# Patient Record
Sex: MALE | Race: WHITE | NOT HISPANIC OR LATINO | Employment: FULL TIME | URBAN - METROPOLITAN AREA
[De-identification: names, ages, dates, MRNs, and addresses within clinical notes are randomized per-mention and may not be internally consistent; named-entity substitution may affect disease eponyms.]

---

## 2021-05-26 ENCOUNTER — RECORDS - HEALTHEAST (OUTPATIENT)
Dept: ADMINISTRATIVE | Facility: CLINIC | Age: 31
End: 2021-05-26

## 2021-05-27 ENCOUNTER — RECORDS - HEALTHEAST (OUTPATIENT)
Dept: ADMINISTRATIVE | Facility: CLINIC | Age: 31
End: 2021-05-27

## 2021-05-29 ENCOUNTER — RECORDS - HEALTHEAST (OUTPATIENT)
Dept: ADMINISTRATIVE | Facility: CLINIC | Age: 31
End: 2021-05-29

## 2021-06-01 ENCOUNTER — RECORDS - HEALTHEAST (OUTPATIENT)
Dept: ADMINISTRATIVE | Facility: CLINIC | Age: 31
End: 2021-06-01

## 2021-06-02 ENCOUNTER — RECORDS - HEALTHEAST (OUTPATIENT)
Dept: ADMINISTRATIVE | Facility: CLINIC | Age: 31
End: 2021-06-02

## 2022-12-20 ENCOUNTER — HOSPITAL ENCOUNTER (EMERGENCY)
Facility: CLINIC | Age: 32
Discharge: HOME | End: 2022-12-20
Attending: EMERGENCY MEDICINE | Admitting: EMERGENCY MEDICINE

## 2022-12-20 VITALS
SYSTOLIC BLOOD PRESSURE: 130 MMHG | OXYGEN SATURATION: 97 % | TEMPERATURE: 99.8 F | HEART RATE: 69 BPM | DIASTOLIC BLOOD PRESSURE: 70 MMHG | RESPIRATION RATE: 18 BRPM | BODY MASS INDEX: 26.18 KG/M2 | WEIGHT: 187 LBS | HEIGHT: 71 IN

## 2022-12-20 DIAGNOSIS — N41.0 ACUTE PROSTATITIS: ICD-10-CM

## 2022-12-20 DIAGNOSIS — K59.00 CONSTIPATION, UNSPECIFIED CONSTIPATION TYPE: ICD-10-CM

## 2022-12-20 LAB
ALBUMIN UR-MCNC: 20 MG/DL
APPEARANCE UR: CLEAR
BILIRUB UR QL STRIP: NEGATIVE
C REACTIVE PROTEIN LHE: 2.7 MG/DL (ref 0–?)
COLOR UR AUTO: YELLOW
ERYTHROCYTE [DISTWIDTH] IN BLOOD BY AUTOMATED COUNT: 14.5 % (ref 10–15)
GLUCOSE UR STRIP-MCNC: NEGATIVE MG/DL
HCT VFR BLD AUTO: 44.8 % (ref 40–53)
HGB BLD-MCNC: 15.2 G/DL (ref 13.3–17.7)
HGB UR QL STRIP: NEGATIVE
HYALINE CASTS: 1 /LPF
KETONES UR STRIP-MCNC: NEGATIVE MG/DL
LEUKOCYTE ESTERASE UR QL STRIP: NEGATIVE
MCH RBC QN AUTO: 30.8 PG (ref 26.5–33)
MCHC RBC AUTO-ENTMCNC: 33.9 G/DL (ref 31.5–36.5)
MCV RBC AUTO: 91 FL (ref 78–100)
MUCOUS THREADS #/AREA URNS LPF: PRESENT /LPF
NITRATE UR QL: NEGATIVE
PH UR STRIP: 6 [PH] (ref 5–7)
PLATELET # BLD AUTO: 291 10E3/UL (ref 150–450)
RBC # BLD AUTO: 4.93 10E6/UL (ref 4.4–5.9)
RBC URINE: 2 /HPF
SP GR UR STRIP: 1.03 (ref 1–1.03)
UROBILINOGEN UR STRIP-MCNC: <2 MG/DL
WBC # BLD AUTO: 7 10E3/UL (ref 4–11)
WBC URINE: 2 /HPF

## 2022-12-20 PROCEDURE — 85041 AUTOMATED RBC COUNT: CPT | Performed by: EMERGENCY MEDICINE

## 2022-12-20 PROCEDURE — 250N000013 HC RX MED GY IP 250 OP 250 PS 637: Performed by: EMERGENCY MEDICINE

## 2022-12-20 PROCEDURE — 81001 URINALYSIS AUTO W/SCOPE: CPT | Performed by: EMERGENCY MEDICINE

## 2022-12-20 PROCEDURE — 99284 EMERGENCY DEPT VISIT MOD MDM: CPT

## 2022-12-20 PROCEDURE — 86140 C-REACTIVE PROTEIN: CPT | Performed by: EMERGENCY MEDICINE

## 2022-12-20 PROCEDURE — 85014 HEMATOCRIT: CPT | Performed by: EMERGENCY MEDICINE

## 2022-12-20 PROCEDURE — 36415 COLL VENOUS BLD VENIPUNCTURE: CPT | Performed by: EMERGENCY MEDICINE

## 2022-12-20 RX ORDER — IBUPROFEN 600 MG/1
600 TABLET, FILM COATED ORAL ONCE
Status: COMPLETED | OUTPATIENT
Start: 2022-12-20 | End: 2022-12-20

## 2022-12-20 RX ORDER — AMOXICILLIN 250 MG
1 CAPSULE ORAL DAILY
Qty: 15 TABLET | Refills: 0 | Status: SHIPPED | OUTPATIENT
Start: 2022-12-20 | End: 2024-09-02

## 2022-12-20 RX ORDER — SULFAMETHOXAZOLE/TRIMETHOPRIM 800-160 MG
1 TABLET ORAL 2 TIMES DAILY
Qty: 42 TABLET | Refills: 0 | Status: SHIPPED | OUTPATIENT
Start: 2022-12-20 | End: 2023-01-10

## 2022-12-20 RX ADMIN — IBUPROFEN 600 MG: 600 TABLET ORAL at 11:13

## 2022-12-20 ASSESSMENT — ACTIVITIES OF DAILY LIVING (ADL)
ADLS_ACUITY_SCORE: 35
ADLS_ACUITY_SCORE: 35
ADLS_ACUITY_SCORE: 33

## 2022-12-20 NOTE — ED NOTES
"ED Triage Provider Note  [facility] EMERGENCY ROOM  Encounter Date: Dec 20, 2022    History:  Chief Complaint   Patient presents with     Hemorrhoids     Oli Akins is a 32 year old male who presents to the ED perirectal pain for 1 week. Feels it's swollen. Has been constipated.  \Exam:  BP (!) 146/100   Pulse 98   Temp 99.8  F (37.7  C) (Temporal)   Resp 18   Ht 1.803 m (5' 11\")   Wt 84.8 kg (187 lb)   SpO2 98%   BMI 26.08 kg/m    General: Mild acute distress. Appears stated age.   Cardio: normal rate, extremities well perfused  Resp: Normal work of breathing, grossly normal respiratory rate  Neuro: Alert. Facial movement grossly symmetric. Grossly intact strength.   Medical Decision Making:  Patient arriving to the ED with problem as above. A medical screening exam was performed.\  Jac Maravilla MD  12/20/2022 at 10:49 AM     Jac Maravilla MD  12/20/22 1051    "

## 2022-12-20 NOTE — ED TRIAGE NOTES
"Patient reports pain to perineum, bumps, swollen. Minimal bleeding. Pain constant.       Triage Assessment     Row Name 12/20/22 1046       Triage Assessment (Adult)    Airway WDL WDL       Respiratory WDL    Respiratory WDL WDL       Skin Circulation/Temperature WDL    Skin Circulation/Temperature WDL X  patient states \"I think there is a rash down there\" re: perineum.       Cardiac WDL    Cardiac WDL WDL       Peripheral/Neurovascular WDL    Peripheral Neurovascular WDL WDL       Cognitive/Neuro/Behavioral WDL    Cognitive/Neuro/Behavioral WDL WDL              "

## 2022-12-20 NOTE — ED PROVIDER NOTES
EMERGENCY DEPARTMENT ENCOUNTER      NAME: Oli Akins  AGE: 32 year old male  YOB: 1990  MRN: [id number]  EVALUATION DATE & TIME: 12/20/2022 10:50 AM    PCP: Johan Park    ED PROVIDER: Jac Maravilla MD      Chief Complaint   Patient presents with     Hemorrhoids         FINAL IMPRESSION:  prostatitis  constipation    ED COURSE & MEDICAL DECISION MAKING:    Pertinent Labs & Imaging studies reviewed. (See chart for details)  32 year old male presents to the Emergency Department for evaluation of perirectal pain and discomfort.  Symptoms ongoing for much of the last week.  Used a suppository as he was of the opinion that he had hemorrhoids.  Suppository created irritation with urination.  Now has burning when he urinates.  This been ongoing for the last 24 hours.  No fevers.  Patient has been troubled by constipation recently.  Patient seen initially in triage and then in the ED.  On exam patient in mild distress.  Abdomen normoactive soft and nontender.  Rectal exam without evidence of hemorrhoids.  No evidence of perianal induration or inflammatory process.  Rectal exam with marked discomfort and marked prostate tenderness.  Primary concern is of UTI/prostatitis.  Patient denies any new sexual contacts or pre-existing penile discharge to suggest STD.  UA being obtained.  Baseline blood work being obtained assess for potential greater infectious process.  Patient treated symptomatically with ibuprofen.  Narcotic pain medications trying to be avoided due to constipation.     11:36AM. CBC normal.  12:24PM. CRP and UA normal . Will treat for constipation and protatitis.  At the conclusion of the encounter I discussed the results of all of the tests and the disposition. The questions were answered. The patient or family acknowledged understanding and was agreeable with the care plan.         MEDICATIONS GIVEN IN THE EMERGENCY:  Medications   ibuprofen (ADVIL/MOTRIN) tablet 600 mg (has  "no administration in time range)       NEW PRESCRIPTIONS STARTED AT TODAY'S ER VISIT  Current Discharge Medication List      START taking these medications    Details   senna-docusate (SENOKOT-S/PERICOLACE) 8.6-50 MG tablet Take 1 tablet by mouth daily  Qty: 15 tablet, Refills: 0      sulfamethoxazole-trimethoprim (BACTRIM DS) 800-160 MG tablet Take 1 tablet by mouth 2 times daily for 21 days  Qty: 42 tablet, Refills: 0                =================================================================    HPI          Oli Akins is a 32 year old male with reports of perirectal discomfort and urinary discomfort.  Patient with constipation for much of the last week.  Then developed achiness in the perineal region over the last few days.  Thought he might have hemorrhoids and used a suppository.  Thereafter developed burning and discomfort with urination over the last 24 hours.  Denies any new sexual contacts or exposures.  No pre-existing penile discharge.  No prior similar episodes.    REVIEW OF SYSTEMS   Review of Systems negative fevers, chills.  No nausea or vomiting.  Constipation is noted.  Dysuria is noted.  Main review of systems otherwise negative     PAST MEDICAL HISTORY:  No past medical history on file.    PAST SURGICAL HISTORY:  No past surgical history on file.        CURRENT MEDICATIONS:    naproxen (NAPROSYN) 500 MG tablet        ALLERGIES:  No Known Allergies    FAMILY HISTORY:  Family History   Problem Relation Age of Onset     Coronary Artery Disease Father        SOCIAL HISTORY:   Social History     Socioeconomic History     Marital status: Single   Tobacco Use     Smoking status: Every Day     Types: Cigarettes   Substance and Sexual Activity     Drug use: Yes     Types: Marijuana   Social History Narrative    6/22/2015: Patient is currently employed.       VITALS:  BP (!) 146/100   Pulse 98   Temp 99.8  F (37.7  C) (Temporal)   Resp 18   Ht 1.803 m (5' 11\")   Wt 84.8 kg (187 lb)   " SpO2 98%   BMI 26.08 kg/m      PHYSICAL EXAM    Constitutional: Well developed, Well nourished, mild distress  HENT: Normocephalic, Atraumatic, Bilateral external ears normal, Oropharynx normal, mucous membranes moist, Nose normal. Neck-  Normal range of motion,   Eyes: PERRL, EOMI, Conjunctiva normal, No discharge.   Respiratory: Normal breath sounds, No respiratory distress, No wheezing, Speaks full sentences easily. No cough.    Cardiovascular: Normal heart rate, Regular rhythm,  No murmurs, No rubs, No gallops.   GI: No excessive obesity. Bowel sounds normal, Soft, No tenderness, No masses, No flank tenderness.   : No external hemorrhoids.  No perianal induration.  Mild to moderate tenderness of the perineal region.  No anal fissures.  Marked prostate tenderness.    Musculoskeletal:. No edema.  No cyanosis, No clubbing. Good range of motion in all major joints.   Integument: Warm, Dry, No erythema, No rash. No petechiae.   Neurologic: Alert & oriented x 3, Normal motor function, Normal sensory function, No focal deficits noted. Normal gait.  Psychiatric: Affect normal, Judgment normal, Mood normal. Cooperative.    LAB:  All pertinent labs reviewed and interpreted.       RADIOLOGY:  Reviewed all pertinent imaging. Please see official radiology report.  No orders to display         Jac Maravilla MD  [facility] EMERGENCY ROOM  [address] [address] [phone]     Jac Maravilla MD  12/20/22 1759

## 2023-01-19 ENCOUNTER — DOCUMENTATION ONLY (OUTPATIENT)
Dept: CARE COORDINATION | Facility: CLINIC | Age: 33
End: 2023-01-19

## 2023-02-02 ENCOUNTER — DOCUMENTATION ONLY (OUTPATIENT)
Dept: CARE COORDINATION | Facility: CLINIC | Age: 33
End: 2023-02-02

## 2024-02-08 ENCOUNTER — HOSPITAL ENCOUNTER (EMERGENCY)
Facility: CLINIC | Age: 34
End: 2024-02-08

## 2024-02-08 ENCOUNTER — HOSPITAL ENCOUNTER (EMERGENCY)
Facility: CLINIC | Age: 34
Discharge: HOME | End: 2024-02-08

## 2024-02-08 VITALS
RESPIRATION RATE: 16 BRPM | HEIGHT: 71 IN | WEIGHT: 170 LBS | SYSTOLIC BLOOD PRESSURE: 126 MMHG | BODY MASS INDEX: 23.8 KG/M2 | OXYGEN SATURATION: 97 % | DIASTOLIC BLOOD PRESSURE: 85 MMHG | HEART RATE: 77 BPM | TEMPERATURE: 97.8 F

## 2024-02-08 DIAGNOSIS — S61.213A LACERATION OF LEFT MIDDLE FINGER WITHOUT FOREIGN BODY WITHOUT DAMAGE TO NAIL, INITIAL ENCOUNTER: ICD-10-CM

## 2024-02-08 PROCEDURE — 12001 RPR S/N/AX/GEN/TRNK 2.5CM/<: CPT

## 2024-02-08 PROCEDURE — 99283 EMERGENCY DEPT VISIT LOW MDM: CPT

## 2024-02-08 NOTE — DISCHARGE INSTRUCTIONS
You were seen in the ER for evaluation of laceration. Your laceration was cleaned and repaired with glue. Please update your tetanus with your primary care provider as soon as possible.    Rest, ice, elevate your extremity, Tylenol and/or ibuprofen as needed for pain. Keep your bandage in place and clean and dry for the first 24 hours, then only clean water - no dirty water (swimming pools, hot tubes, saunas, lakes, etc.) until your wound is healed.    Tylenol (Acetaminophen) Discharge Instructions:  You may take 2 tablets of regular strength, over-the-counter, Tylenol (acetaminophen) every 4-6 hours as needed for pain.  Take no more than 4000 mg of Tylenol in a 24-hour period.      Avoid taking more than 1 acetaminophen-containing product at a time and be aware that many over-the-counter medications contain a combination of acetaminophen and other products.  If you are taking Tylenol in addition to a combination product please keep track of your daily acetaminophen dose to make sure you do not exceed the recommended 4000 mg.  Taking too much acetaminophen can cause permanent damage to your liver.    Ibuprofen/Naproxen Discharge Instructions:  You may take ibuprofen for pain control.  The maximum dose of (ibuprofen is 3200 mg ) in a 24-hour period.    Take this medication with food to prevent stomach irritation.  With long-term use this medication can irritate the stomach causing pain and lead to development of a stomach ulcer.  If you notice stomach pain or vomiting of coffee-ground colored vomit or blood, please be seen by a healthcare provider.  Attempt to use this medication for the shortest time possible.      Follow-up with your primary care provider in 3-5 days for reevaluation.     Return to the emergency department for any new or worsening symptoms including worsening pain, redness/warmth/drainage/swelling, streaking redness up your extremity, fever/chills, new weakness/numbness/tingling, decreased range of  motion, cool extremity, or any other concerning symptoms.    Take Care!  - Dipika Flanagan PA-C

## 2024-02-08 NOTE — ED PROVIDER NOTES
EMERGENCY DEPARTMENT ENCOUNTER      NAME: Oli Akins  AGE: 33 year old male  YOB: 1990  MRN: [id number]  EVALUATION DATE & TIME: No admission date for patient encounter.    PCP: No Ref-Primary, Physician    ED PROVIDER: Dipika Flanagan PA-C      Chief Complaint   Patient presents with    Laceration         FINAL IMPRESSION:  1. Laceration of left middle finger without foreign body without damage to nail, initial encounter          ED COURSE & MEDICAL DECISION MAKING:    3:40 PM Met with patient for initial interview. Plan for care discussed.   4:00 PM Reevaluated and updated patient. Laceration repair performed. I discussed the plan for discharge with the patient, and patient is agreeable. We discussed supportive cares at home and reasons for return to the ER including new or worsening symptoms. All questions and concerns addressed. Patient to be discharged by RN.    33 year old male presents to the Emergency Department for evaluation of laceration after accidentally cutting it with a pocket knife prior to arrival. Upon exam, patient is afebrile, hemodynamically stable, and in no acute distress. Superficial horizontal laceration middle phalanx left middle finger. No active bleeding. No evidence of tendon or nail involvement. No obvious foreign body. 5/5 strength. Discussed option of XR with patient to rule out fracture or foreign body, which patient declines.    Per chart review, tetanus not up-to-date, Tdap ordered in ED, but patient declining prior to discharge as does not want to stay to get it and states he will update it with his PCP. Based on the presenting symptoms, the wound was irrigated, explored, and closed with Dermabond as documented below. Patient was educated on signs of infection including redness, drainage, warmth, fever/chills with instructions to return immediately if infection suspected or new weakness/numbness/tingling, decreased ROM, or cold extremity. Patient also  educated to keep the wound clean and dry for the next 24 hours. Patient advised to set up an appointment with PCP in 3-5 days for reevaluation. The patient was advised to return to the ER if new or worsening symptoms develop. The patient was stable and well appearing throughout entire ER visit and upon discharge. The patient verbalizes understanding and agrees with the plan.    Medical Decision Making  Obtained supplemental history:Supplemental history obtained?: No  Reviewed external records: External records reviewed?: No  Care impacted by chronic illness:Smoking / Nicotine Use  Care significantly affected by social determinants of health:N/A  Did you consider but not order tests?: Work up considered but not performed and documented in chart, if applicable  Did you interpret images independently?: Independent interpretation of ECG and images noted in documentation, when applicable.  Consultation discussion with other provider:Did you involve another provider (consultant, , pharmacy, etc.)?: No  Discharge. No recommendations on prescription strength medication(s). See documentation for any additional details.    MEDICATIONS GIVEN IN THE EMERGENCY:  Medications   Tdap (tetanus-diphtheria-acell pertussis) (ADACEL) injection 0.5 mL (0.5 mLs Intramuscular Not Given 2/8/24 1648)       NEW PRESCRIPTIONS STARTED AT TODAY'S ER VISIT  Discharge Medication List as of 2/8/2024  4:46 PM             =================================================================    HPI    Patient information was obtained from: patient    Use of : N/A       Oli Akins is a 33 year old male who presents to this ED for evaluation of laceration. Last tetanus was 1996.  Patient reports accidentally cutting his left middle finger with a pocket knife just prior to arrival.  He reports some bleeding initially, but this has since stopped since putting on pressure.  He denies any weakness/numbness/tingling, decreased range of  "motion, or any significant pain in the finger.  He denies any concern for foreign body or fracture.  He denies pain in the finger.  He denies history of diabetes.  He does admit to tobacco use, 1 pack/day.  He is right-handed.      REVIEW OF SYSTEMS   Review of Systems see HPI.    PAST MEDICAL HISTORY:  No past medical history on file.    PAST SURGICAL HISTORY:  No past surgical history on file.        CURRENT MEDICATIONS:    naproxen (NAPROSYN) 500 MG tablet  senna-docusate (SENOKOT-S/PERICOLACE) 8.6-50 MG tablet        ALLERGIES:  No Known Allergies    FAMILY HISTORY:  Family History   Problem Relation Age of Onset    Coronary Artery Disease Father        SOCIAL HISTORY:   Social History     Socioeconomic History    Marital status: Single   Tobacco Use    Smoking status: Every Day     Types: Cigarettes   Substance and Sexual Activity    Drug use: Yes     Types: Marijuana   Social History Narrative    6/22/2015: Patient is currently employed.       VITALS:  /85   Pulse 77   Temp 97.8  F (36.6  C) (Oral)   Resp 16   Ht 1.803 m (5' 11\")   Wt 77.1 kg (170 lb)   SpO2 97%   BMI 23.71 kg/m      PHYSICAL EXAM    Constitutional:  Alert, in no acute distress. Cooperative.  EYES: Conjunctivae clear.  HENT:  Atraumatic, normocephalic.  Respiratory:  Respirations even, unlabored, in no acute respiratory distress.  Cardiovascular:  Regular rate, good peripheral perfusion.  GI: Soft, flat, non-distended.  Musculoskeletal: Superficial horizontal 2 cm laceration middle phalanx left middle finger. No active bleeding. No evidence of tendon or nail involvement. No surrounding erythema, warmth, purulence, fluctuance, swelling, crepitus, or obvious bony deformity. No obvious foreign body visualized. No nail or tendon involvement. Full ROM without pain. Neurovascularly intact distally. Cap refill <2 seconds. 5/5 strength.  Integument: Warm, Dry.   Neurologic:  Alert & oriented. No focal deficits noted.  Psych: Normal mood " and affect.      LAB:  All pertinent labs reviewed and interpreted.       RADIOLOGY:  Reviewed all pertinent imaging. Please see official radiology report.  No orders to display     PROCEDURES:     PROCEDURE: Laceration Repair   INDICATIONS: Laceration   PROCEDURE PROVIDER: Dipika Flanagan PA-C   SITE: Left middle finger   TYPE/SIZE: simple, superficial, clean, and no foreign body visualized  2 cm (total length)   FUNCTIONAL ASSESSMENT: Distal sensation, circulation, motor, and tendon function intact   MEDICATION: NA   PREPARATION: irrigation with Normal saline   DEBRIDEMENT: minimal debridement   CLOSURE:  Superficial layer closed with Dermabond (medical glue)    Total number of sutures/staples placed: 0     Dipika Flanagan PA-C  [facility] EMERGENCY ROOM  [facility] [address] [phone]      Dipika Flanagan PA-C  02/08/24 4530

## 2024-02-08 NOTE — ED TRIAGE NOTES
"Arrives to ED with c/o laceration to 3rd digit L hand that occurred just PTA. Knife broke and cut finger. \"Relatively\" new blade. Unknown last tetanus. Bleeding controlled. 1cm shallow laceration noted.      Triage Assessment (Adult)       Row Name 02/08/24 1526          Triage Assessment    Airway WDL WDL        Respiratory WDL    Respiratory WDL WDL        Skin Circulation/Temperature WDL    Skin Circulation/Temperature WDL WDL        Cardiac WDL    Cardiac WDL WDL        Peripheral/Neurovascular WDL    Peripheral Neurovascular WDL WDL        Cognitive/Neuro/Behavioral WDL    Cognitive/Neuro/Behavioral WDL WDL                     "

## 2024-02-08 NOTE — ED NOTES
Pt called to give TDAP and review discharge instructions. Pt stated he did not want to receive his Tdap here in the ER but that he would follow up with primary care to receive vaccine.

## 2024-07-11 ENCOUNTER — APPOINTMENT (OUTPATIENT)
Dept: CT IMAGING | Facility: CLINIC | Age: 34
End: 2024-07-11

## 2024-07-11 ENCOUNTER — HOSPITAL ENCOUNTER (EMERGENCY)
Facility: CLINIC | Age: 34
Discharge: HOME | End: 2024-07-11
Attending: STUDENT IN AN ORGANIZED HEALTH CARE EDUCATION/TRAINING PROGRAM | Admitting: STUDENT IN AN ORGANIZED HEALTH CARE EDUCATION/TRAINING PROGRAM

## 2024-07-11 VITALS
OXYGEN SATURATION: 98 % | WEIGHT: 168.1 LBS | SYSTOLIC BLOOD PRESSURE: 133 MMHG | HEART RATE: 69 BPM | TEMPERATURE: 99.3 F | RESPIRATION RATE: 20 BRPM | HEIGHT: 68 IN | DIASTOLIC BLOOD PRESSURE: 84 MMHG | BODY MASS INDEX: 25.48 KG/M2

## 2024-07-11 DIAGNOSIS — M54.50 ACUTE RIGHT-SIDED LOW BACK PAIN WITHOUT SCIATICA: ICD-10-CM

## 2024-07-11 PROCEDURE — 72131 CT LUMBAR SPINE W/O DYE: CPT

## 2024-07-11 PROCEDURE — 99285 EMERGENCY DEPT VISIT HI MDM: CPT | Mod: 25

## 2024-07-11 PROCEDURE — 96372 THER/PROPH/DIAG INJ SC/IM: CPT

## 2024-07-11 PROCEDURE — 250N000013 HC RX MED GY IP 250 OP 250 PS 637

## 2024-07-11 PROCEDURE — 250N000011 HC RX IP 250 OP 636

## 2024-07-11 RX ORDER — KETOROLAC TROMETHAMINE 10 MG/1
10 TABLET, FILM COATED ORAL EVERY 8 HOURS PRN
Qty: 20 TABLET | Refills: 0 | Status: ON HOLD | OUTPATIENT
Start: 2024-07-11 | End: 2024-09-03

## 2024-07-11 RX ORDER — KETOROLAC TROMETHAMINE 15 MG/ML
15 INJECTION, SOLUTION INTRAMUSCULAR; INTRAVENOUS ONCE
Status: DISCONTINUED | OUTPATIENT
Start: 2024-07-11 | End: 2024-07-11

## 2024-07-11 RX ORDER — LIDOCAINE 4 G/G
1 PATCH TOPICAL
Status: DISCONTINUED | OUTPATIENT
Start: 2024-07-11 | End: 2024-07-11 | Stop reason: HOSPADM

## 2024-07-11 RX ORDER — KETOROLAC TROMETHAMINE 30 MG/ML
30 INJECTION, SOLUTION INTRAMUSCULAR; INTRAVENOUS ONCE
Status: COMPLETED | OUTPATIENT
Start: 2024-07-11 | End: 2024-07-11

## 2024-07-11 RX ORDER — CYCLOBENZAPRINE HCL 10 MG
10 TABLET ORAL 3 TIMES DAILY PRN
Qty: 20 TABLET | Refills: 0 | Status: SHIPPED | OUTPATIENT
Start: 2024-07-11 | End: 2024-07-17

## 2024-07-11 RX ORDER — CYCLOBENZAPRINE HCL 10 MG
10 TABLET ORAL ONCE
Status: COMPLETED | OUTPATIENT
Start: 2024-07-11 | End: 2024-07-11

## 2024-07-11 RX ADMIN — CYCLOBENZAPRINE 10 MG: 10 TABLET, FILM COATED ORAL at 12:13

## 2024-07-11 RX ADMIN — KETOROLAC TROMETHAMINE 30 MG: 30 INJECTION, SOLUTION INTRAMUSCULAR at 12:14

## 2024-07-11 RX ADMIN — LIDOCAINE 1 PATCH: 4 PATCH TOPICAL at 12:16

## 2024-07-11 ASSESSMENT — COLUMBIA-SUICIDE SEVERITY RATING SCALE - C-SSRS
2. HAVE YOU ACTUALLY HAD ANY THOUGHTS OF KILLING YOURSELF IN THE PAST MONTH?: NO
6. HAVE YOU EVER DONE ANYTHING, STARTED TO DO ANYTHING, OR PREPARED TO DO ANYTHING TO END YOUR LIFE?: NO
1. IN THE PAST MONTH, HAVE YOU WISHED YOU WERE DEAD OR WISHED YOU COULD GO TO SLEEP AND NOT WAKE UP?: NO

## 2024-07-11 ASSESSMENT — ACTIVITIES OF DAILY LIVING (ADL)
ADLS_ACUITY_SCORE: 35
ADLS_ACUITY_SCORE: 35

## 2024-07-11 NOTE — Clinical Note
Oli Akins was seen and treated in our emergency department on 7/11/2024.  He may return to work on 07/19/2024.       If you have any questions or concerns, please don't hesitate to call.      Saeed Lechuga MD

## 2024-07-11 NOTE — DISCHARGE INSTRUCTIONS
You are written a prescription anti-inflammatory called Toradol or ketorolac.  This is in the NSAID family.  While taking this you should not take any additional NSAIDs such as ibuprofen, Advil, Motrin, Aleve, or naproxen

## 2024-07-11 NOTE — ED PROVIDER NOTES
"EMERGENCY DEPARTMENT ENCOUNTER       ED Course & Medical Decision Making     11:30 AM I received information on the patient from Resident Ivania Herrera.     Final Impression  33 year old male presents for evaluation of acute onset low back pain that began on 7/7/2024 while lifting a couch.  States that he was lifting the couch, felt a \"pop\" in his low back, had immediate onset pain, pain has been about 8 out of 10 since that time.  Pain is fairly well localized to his low back, just right of the L4 area.  Pain increases with leaning forward/back flexion or with laying flat.  Denies any strength or sensory deficits.  Pain is worse with walking.  No urinary symptoms or bladder or bowel dysfunction reported.  He is wearing a Velcro back brace for comfort upon arrival.  Took 2 tabs of Tylenol 60 mg ibuprofens morning with minimal relief of symptoms.  On exam patients pain does seem reproducible with palpation over the right L4 paraspinal area, most negative for musculoskeletal injury.  No red flag neurologic symptoms.  CT of the lumbar spine without acute fracture.  Patient given dose of IM Toradol, Flexeril, and a lidocaine patch and pain does seem to be improving to some degree.  Physical exam fairly reassuring, most likely musculoskeletal, imaging also reassuring, responding well to muscle relaxers and anti-inflammatories.  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  All questions answered, will discharge home with short prescription for Flexeril and Toradol as well as a work note.  Patient ambulated out of the department independently.    Prior to making a final disposition on this patient the results of patient's tests and other diagnostic studies were discussed with the patient. All questions were answered. " Patient expressed understanding of the plan and was amenable to it.    Medical Decision Making    History:  Supplemental history from: None  External Record(s) reviewed as documented below;  NA    Work Up:  Chart documentation includes differential considered and any EKGs or imaging independently interpreted by provider, where specified.  DDx considered but not limited to: Lumbar fracture, musculoskeletal back strain, sciatica, SI joint dysfunction, disc    Complicating factors:  Care impacted by chronic illness: N/A  Care affected by social determinants of health: Access to Medical Care    Disposition considerations: Discharge. I prescribed additional prescription strength medication(s) as charted. I considered admission, but discharged patient after significant clinical improvement.      Medications   Lidocaine (LIDOCARE) 4 % Patch 1 patch (1 patch Transdermal $Patch/Med Applied 7/11/24 1216)   cyclobenzaprine (FLEXERIL) tablet 10 mg (10 mg Oral $Given 7/11/24 1213)   ketorolac (TORADOL) injection 30 mg (30 mg Intramuscular $Given 7/11/24 1214)       New Prescriptions    CYCLOBENZAPRINE (FLEXERIL) 10 MG TABLET    Take 1 tablet (10 mg) by mouth 3 times daily as needed for muscle spasms    KETOROLAC (TORADOL) 10 MG TABLET    Take 1 tablet (10 mg) by mouth every 8 hours as needed for moderate pain     Modified Medications    No medications on file       Final Impression     1. Acute right-sided low back pain without sciatica        Chief Complaint     Chief Complaint   Patient presents with    Back Pain     Sunday pt was moving a couch and felt a pop in his back and has had pain ever since. Pt wearing brace from his dad that helps a little bit. Denies bowel or bladder issues. Sensation and movement intact both feet. Walked slowly to triage area. Drove self here. Took tylenol and ibuprofen this am with no relief.     HPI     Oli Akins is a 33 year old male with no pertinent history who presents for  "evaluation of back pain.     On 7/7/2024, patient was moving a couch when he felt a pop in his back. He has had localized back pain ever since, which he rates an 8/10. Pain increases with flexion, leaning forward, walking, and in supine position. Patient has taken ibuprofen (600 mg today) and Tylenol (2 tablets today) for his pain with no alleviation.     Denies any urinary symptoms. No change in sensation or muscle strength in lower extremities. Does not have a PCP. No history of similar incidents.     I, Jacquelyn Walden, am serving as a scribe to document services personally performed by Dr. Saeed Lechuga MD, based on my observation and the provider's statements to me. I, Dr. Saeed Lechuga MD attest that Jacquelyn Walden is acting in a scribe capacity, has observed my performance of the services and has documented them in accordance with my direction.    Physical Exam     /84   Pulse 69   Temp 99.3  F (37.4  C)   Resp 20   Ht 1.727 m (5' 8\")   Wt 76.2 kg (168 lb 1.6 oz)   SpO2 98%   BMI 25.56 kg/m    Constitutional: Awake, alert, in no acute distress.  Head: Normocephalic, atraumatic.  ENT: Mucous membranes moist.  Eyes: Conjunctiva normal.  Respiratory: Respirations even, unlabored, in no acute respiratory distress.  Cardiovascular: Regular rate and rhythm. Good peripheral perfusion.  GI: Abdomen soft, non-tender.  Musculoskeletal: Moves all 4 extremities equally. Localized tenderness near the L4 area with moderate associated right sided paraspinal tenderness that is worse with palpation.  No rashes or bruising in the associated area.  Integument: Warm, dry.  Neurologic: Alert & oriented x 3. Normal speech. Grossly normal motor and sensory function. No focal deficits noted.  Able plantarflex and dorsiflex bilaterally, some pain with straight leg raise bilaterally,  Psychiatric: Normal mood    Labs & Imaging     Imaging reviewed and independently interpreted as below;   CT lumbar spine images " reviewed, no acute fractures seen.     Results for orders placed or performed during the hospital encounter of 07/11/24   CT Lumbar Spine w/o Contrast    Impression    IMPRESSION:    1.  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  Careful attention to the numbering convention is recommended prior to any future percutaneous or surgical intervention.  2.  No evidence of acute fracture or subluxation of the lumbar spine by CT imaging.  3.  L5 spondylolysis with spondylolisthesis of L5 on S1 measuring 1 mm.   4.  Degenerative lumbar spondylosis with level by level analysis as described above.            Saeed Lechuga MD  07/11/24 9990

## 2024-07-11 NOTE — ED TRIAGE NOTES
Sunday pt was moving a couch and felt a pop in his back and has had pain ever since. Pt wearing brace from his dad that helps a little bit. Denies bowel or bladder issues. Sensation and movement intact both feet. Walked slowly to triage area. Drove self here. Took tylenol and ibuprofen this am with no relief.

## 2024-09-02 ENCOUNTER — HOSPITAL ENCOUNTER (INPATIENT)
Facility: CLINIC | Age: 34
LOS: 1 days | Discharge: HOME | DRG: 897 | End: 2024-09-03
Attending: EMERGENCY MEDICINE | Admitting: STUDENT IN AN ORGANIZED HEALTH CARE EDUCATION/TRAINING PROGRAM

## 2024-09-02 DIAGNOSIS — F10.939 ALCOHOL WITHDRAWAL SYNDROME WITH COMPLICATION (H): ICD-10-CM

## 2024-09-02 DIAGNOSIS — R45.851 SUICIDAL IDEATION: ICD-10-CM

## 2024-09-02 DIAGNOSIS — F10.10 ALCOHOL ABUSE: ICD-10-CM

## 2024-09-02 DIAGNOSIS — F41.9 ANXIETY: Primary | ICD-10-CM

## 2024-09-02 DIAGNOSIS — E83.39 HYPOPHOSPHATEMIA: ICD-10-CM

## 2024-09-02 LAB
ALBUMIN SERPL BCG-MCNC: 4.8 G/DL (ref 3.5–5.2)
ALBUMIN UR-MCNC: 20 MG/DL
ALP SERPL-CCNC: 82 U/L (ref 40–150)
ALT SERPL W P-5'-P-CCNC: 49 U/L (ref 0–70)
AMPHETAMINES UR QL SCN: ABNORMAL
ANION GAP SERPL CALCULATED.3IONS-SCNC: 25 MMOL/L (ref 7–15)
APPEARANCE UR: CLEAR
AST SERPL W P-5'-P-CCNC: ABNORMAL U/L
ATRIAL RATE - MUSE: 86 BPM
B-OH-BUTYR SERPL-SCNC: 3.96 MMOL/L
BARBITURATES UR QL SCN: ABNORMAL
BASOPHILS # BLD AUTO: 0.1 10E3/UL (ref 0–0.2)
BASOPHILS NFR BLD AUTO: 1 %
BENZODIAZ UR QL SCN: ABNORMAL
BILIRUB DIRECT SERPL-MCNC: ABNORMAL MG/DL
BILIRUB SERPL-MCNC: 1.6 MG/DL
BILIRUB UR QL STRIP: NEGATIVE
BUN SERPL-MCNC: 17.2 MG/DL (ref 6–20)
BZE UR QL SCN: ABNORMAL
CALCIUM SERPL-MCNC: 9.8 MG/DL (ref 8.8–10.4)
CANNABINOIDS UR QL SCN: ABNORMAL
CHLORIDE SERPL-SCNC: 96 MMOL/L (ref 98–107)
COLOR UR AUTO: YELLOW
CREAT SERPL-MCNC: 0.9 MG/DL (ref 0.67–1.17)
CREAT SERPL-MCNC: 0.9 MG/DL (ref 0.67–1.17)
CRP SERPL-MCNC: <3 MG/L
DIASTOLIC BLOOD PRESSURE - MUSE: NORMAL MMHG
EGFRCR SERPLBLD CKD-EPI 2021: >90 ML/MIN/1.73M2
EGFRCR SERPLBLD CKD-EPI 2021: >90 ML/MIN/1.73M2
EOSINOPHIL # BLD AUTO: 0.1 10E3/UL (ref 0–0.7)
EOSINOPHIL NFR BLD AUTO: 2 %
ERYTHROCYTE [DISTWIDTH] IN BLOOD BY AUTOMATED COUNT: 13.3 % (ref 10–15)
ETHANOL SERPL-MCNC: <0.01 G/DL
FENTANYL UR QL: ABNORMAL
GLUCOSE SERPL-MCNC: 99 MG/DL (ref 70–99)
GLUCOSE UR STRIP-MCNC: NEGATIVE MG/DL
HCO3 SERPL-SCNC: 17 MMOL/L (ref 22–29)
HCT VFR BLD AUTO: 45.8 % (ref 40–53)
HGB BLD-MCNC: 16.5 G/DL (ref 13.3–17.7)
HGB UR QL STRIP: NEGATIVE
HOLD SPECIMEN: NORMAL
IMM GRANULOCYTES # BLD: 0 10E3/UL
IMM GRANULOCYTES NFR BLD: 0 %
INR PPP: 1.04 (ref 0.85–1.15)
INTERPRETATION ECG - MUSE: NORMAL
KETONES UR STRIP-MCNC: 100 MG/DL
LACTATE SERPL-SCNC: 1 MMOL/L (ref 0.7–2)
LEUKOCYTE ESTERASE UR QL STRIP: NEGATIVE
LIPASE SERPL-CCNC: 39 U/L (ref 13–60)
LYMPHOCYTES # BLD AUTO: 1.5 10E3/UL (ref 0.8–5.3)
LYMPHOCYTES NFR BLD AUTO: 28 %
MAGNESIUM SERPL-MCNC: 1.8 MG/DL (ref 1.7–2.3)
MAGNESIUM SERPL-MCNC: 2 MG/DL (ref 1.7–2.3)
MCH RBC QN AUTO: 31.7 PG (ref 26.5–33)
MCHC RBC AUTO-ENTMCNC: 36 G/DL (ref 31.5–36.5)
MCV RBC AUTO: 88 FL (ref 78–100)
MONOCYTES # BLD AUTO: 0.6 10E3/UL (ref 0–1.3)
MONOCYTES NFR BLD AUTO: 11 %
MUCOUS THREADS #/AREA URNS LPF: PRESENT /LPF
NEUTROPHILS # BLD AUTO: 3.1 10E3/UL (ref 1.6–8.3)
NEUTROPHILS NFR BLD AUTO: 57 %
NITRATE UR QL: NEGATIVE
NRBC # BLD AUTO: 0 10E3/UL
NRBC BLD AUTO-RTO: 0 /100
OPIATES UR QL SCN: ABNORMAL
P AXIS - MUSE: 77 DEGREES
PCP QUAL URINE (ROCHE): ABNORMAL
PH UR STRIP: 6 [PH] (ref 5–7)
PHOSPHATE SERPL-MCNC: 1.3 MG/DL (ref 2.5–4.5)
PHOSPHATE SERPL-MCNC: 4 MG/DL (ref 2.5–4.5)
PLATELET # BLD AUTO: 340 10E3/UL (ref 150–450)
POTASSIUM SERPL-SCNC: 4.5 MMOL/L (ref 3.4–5.3)
PR INTERVAL - MUSE: 128 MS
PROT SERPL-MCNC: 7.8 G/DL (ref 6.4–8.3)
QRS DURATION - MUSE: 104 MS
QT - MUSE: 378 MS
QTC - MUSE: 452 MS
R AXIS - MUSE: 25 DEGREES
RBC # BLD AUTO: 5.21 10E6/UL (ref 4.4–5.9)
RBC URINE: 2 /HPF
SODIUM SERPL-SCNC: 138 MMOL/L (ref 135–145)
SP GR UR STRIP: 1.03 (ref 1–1.03)
SQUAMOUS EPITHELIAL: <1 /HPF
SYSTOLIC BLOOD PRESSURE - MUSE: NORMAL MMHG
T AXIS - MUSE: -7 DEGREES
UROBILINOGEN UR STRIP-MCNC: <2 MG/DL
VENTRICULAR RATE- MUSE: 86 BPM
WBC # BLD AUTO: 5.5 10E3/UL (ref 4–11)
WBC URINE: 4 /HPF

## 2024-09-02 PROCEDURE — 85610 PROTHROMBIN TIME: CPT | Performed by: EMERGENCY MEDICINE

## 2024-09-02 PROCEDURE — 83690 ASSAY OF LIPASE: CPT | Performed by: EMERGENCY MEDICINE

## 2024-09-02 PROCEDURE — 82077 ASSAY SPEC XCP UR&BREATH IA: CPT | Performed by: EMERGENCY MEDICINE

## 2024-09-02 PROCEDURE — 96375 TX/PRO/DX INJ NEW DRUG ADDON: CPT

## 2024-09-02 PROCEDURE — 258N000003 HC RX IP 258 OP 636: Performed by: EMERGENCY MEDICINE

## 2024-09-02 PROCEDURE — 85025 COMPLETE CBC W/AUTO DIFF WBC: CPT | Performed by: EMERGENCY MEDICINE

## 2024-09-02 PROCEDURE — HZ2ZZZZ DETOXIFICATION SERVICES FOR SUBSTANCE ABUSE TREATMENT: ICD-10-PCS | Performed by: STUDENT IN AN ORGANIZED HEALTH CARE EDUCATION/TRAINING PROGRAM

## 2024-09-02 PROCEDURE — 82010 KETONE BODYS QUAN: CPT | Performed by: STUDENT IN AN ORGANIZED HEALTH CARE EDUCATION/TRAINING PROGRAM

## 2024-09-02 PROCEDURE — 250N000011 HC RX IP 250 OP 636: Performed by: STUDENT IN AN ORGANIZED HEALTH CARE EDUCATION/TRAINING PROGRAM

## 2024-09-02 PROCEDURE — 81001 URINALYSIS AUTO W/SCOPE: CPT | Performed by: EMERGENCY MEDICINE

## 2024-09-02 PROCEDURE — 99285 EMERGENCY DEPT VISIT HI MDM: CPT | Mod: 25

## 2024-09-02 PROCEDURE — 36415 COLL VENOUS BLD VENIPUNCTURE: CPT | Performed by: STUDENT IN AN ORGANIZED HEALTH CARE EDUCATION/TRAINING PROGRAM

## 2024-09-02 PROCEDURE — 80307 DRUG TEST PRSMV CHEM ANLYZR: CPT | Performed by: EMERGENCY MEDICINE

## 2024-09-02 PROCEDURE — 120N000004 HC R&B MS OVERFLOW

## 2024-09-02 PROCEDURE — 99222 1ST HOSP IP/OBS MODERATE 55: CPT | Performed by: STUDENT IN AN ORGANIZED HEALTH CARE EDUCATION/TRAINING PROGRAM

## 2024-09-02 PROCEDURE — 93005 ELECTROCARDIOGRAM TRACING: CPT | Performed by: EMERGENCY MEDICINE

## 2024-09-02 PROCEDURE — 258N000003 HC RX IP 258 OP 636: Performed by: STUDENT IN AN ORGANIZED HEALTH CARE EDUCATION/TRAINING PROGRAM

## 2024-09-02 PROCEDURE — 36415 COLL VENOUS BLD VENIPUNCTURE: CPT | Performed by: EMERGENCY MEDICINE

## 2024-09-02 PROCEDURE — 250N000013 HC RX MED GY IP 250 OP 250 PS 637: Performed by: EMERGENCY MEDICINE

## 2024-09-02 PROCEDURE — 83735 ASSAY OF MAGNESIUM: CPT | Performed by: STUDENT IN AN ORGANIZED HEALTH CARE EDUCATION/TRAINING PROGRAM

## 2024-09-02 PROCEDURE — 84100 ASSAY OF PHOSPHORUS: CPT | Performed by: EMERGENCY MEDICINE

## 2024-09-02 PROCEDURE — 83605 ASSAY OF LACTIC ACID: CPT | Performed by: STUDENT IN AN ORGANIZED HEALTH CARE EDUCATION/TRAINING PROGRAM

## 2024-09-02 PROCEDURE — 250N000013 HC RX MED GY IP 250 OP 250 PS 637: Performed by: STUDENT IN AN ORGANIZED HEALTH CARE EDUCATION/TRAINING PROGRAM

## 2024-09-02 PROCEDURE — 83735 ASSAY OF MAGNESIUM: CPT | Performed by: EMERGENCY MEDICINE

## 2024-09-02 PROCEDURE — 250N000011 HC RX IP 250 OP 636: Performed by: EMERGENCY MEDICINE

## 2024-09-02 PROCEDURE — 84460 ALANINE AMINO (ALT) (SGPT): CPT | Performed by: EMERGENCY MEDICINE

## 2024-09-02 PROCEDURE — 84100 ASSAY OF PHOSPHORUS: CPT | Performed by: STUDENT IN AN ORGANIZED HEALTH CARE EDUCATION/TRAINING PROGRAM

## 2024-09-02 PROCEDURE — 96361 HYDRATE IV INFUSION ADD-ON: CPT

## 2024-09-02 PROCEDURE — 80069 RENAL FUNCTION PANEL: CPT | Performed by: EMERGENCY MEDICINE

## 2024-09-02 PROCEDURE — 96374 THER/PROPH/DIAG INJ IV PUSH: CPT

## 2024-09-02 PROCEDURE — 86140 C-REACTIVE PROTEIN: CPT | Performed by: EMERGENCY MEDICINE

## 2024-09-02 RX ORDER — CLONIDINE HYDROCHLORIDE 0.1 MG/1
0.1 TABLET ORAL EVERY 8 HOURS
Status: DISCONTINUED | OUTPATIENT
Start: 2024-09-02 | End: 2024-09-03 | Stop reason: HOSPADM

## 2024-09-02 RX ORDER — FLUMAZENIL 0.1 MG/ML
0.2 INJECTION, SOLUTION INTRAVENOUS
Status: DISCONTINUED | OUTPATIENT
Start: 2024-09-02 | End: 2024-09-02

## 2024-09-02 RX ORDER — ENOXAPARIN SODIUM 100 MG/ML
40 INJECTION SUBCUTANEOUS EVERY 24 HOURS
Status: DISCONTINUED | OUTPATIENT
Start: 2024-09-02 | End: 2024-09-03 | Stop reason: HOSPADM

## 2024-09-02 RX ORDER — FOLIC ACID 1 MG/1
1 TABLET ORAL DAILY
Status: DISCONTINUED | OUTPATIENT
Start: 2024-09-03 | End: 2024-09-03 | Stop reason: HOSPADM

## 2024-09-02 RX ORDER — AMOXICILLIN 250 MG
2 CAPSULE ORAL 2 TIMES DAILY PRN
Status: DISCONTINUED | OUTPATIENT
Start: 2024-09-02 | End: 2024-09-03 | Stop reason: HOSPADM

## 2024-09-02 RX ORDER — ONDANSETRON 2 MG/ML
4 INJECTION INTRAMUSCULAR; INTRAVENOUS ONCE
Status: COMPLETED | OUTPATIENT
Start: 2024-09-02 | End: 2024-09-02

## 2024-09-02 RX ORDER — LORAZEPAM 1 MG/1
1-2 TABLET ORAL EVERY 30 MIN PRN
Status: DISCONTINUED | OUTPATIENT
Start: 2024-09-02 | End: 2024-09-03 | Stop reason: HOSPADM

## 2024-09-02 RX ORDER — IBUPROFEN 200 MG
400 TABLET ORAL EVERY 4 HOURS PRN
Status: ON HOLD | COMMUNITY
End: 2024-09-03

## 2024-09-02 RX ORDER — NICOTINE 21 MG/24HR
1 PATCH, TRANSDERMAL 24 HOURS TRANSDERMAL DAILY
Status: DISCONTINUED | OUTPATIENT
Start: 2024-09-02 | End: 2024-09-03 | Stop reason: HOSPADM

## 2024-09-02 RX ORDER — DIAZEPAM 10 MG/2ML
5-10 INJECTION, SOLUTION INTRAMUSCULAR; INTRAVENOUS EVERY 30 MIN PRN
Status: DISCONTINUED | OUTPATIENT
Start: 2024-09-02 | End: 2024-09-02

## 2024-09-02 RX ORDER — LIDOCAINE 40 MG/G
CREAM TOPICAL
Status: DISCONTINUED | OUTPATIENT
Start: 2024-09-02 | End: 2024-09-03 | Stop reason: HOSPADM

## 2024-09-02 RX ORDER — SODIUM CHLORIDE 9 MG/ML
INJECTION, SOLUTION INTRAVENOUS CONTINUOUS
Status: DISCONTINUED | OUTPATIENT
Start: 2024-09-02 | End: 2024-09-03 | Stop reason: HOSPADM

## 2024-09-02 RX ORDER — MULTIPLE VITAMINS W/ MINERALS TAB 9MG-400MCG
1 TAB ORAL DAILY
Status: DISCONTINUED | OUTPATIENT
Start: 2024-09-03 | End: 2024-09-03 | Stop reason: HOSPADM

## 2024-09-02 RX ORDER — DIAZEPAM 5 MG
10 TABLET ORAL EVERY 30 MIN PRN
Status: DISCONTINUED | OUTPATIENT
Start: 2024-09-02 | End: 2024-09-02

## 2024-09-02 RX ORDER — CYCLOBENZAPRINE HCL 10 MG
10 TABLET ORAL 3 TIMES DAILY PRN
Status: ON HOLD | COMMUNITY
End: 2024-09-03

## 2024-09-02 RX ORDER — HALOPERIDOL 5 MG/ML
2.5-5 INJECTION INTRAMUSCULAR EVERY 6 HOURS PRN
Status: DISCONTINUED | OUTPATIENT
Start: 2024-09-02 | End: 2024-09-02

## 2024-09-02 RX ORDER — LORAZEPAM 2 MG/ML
1-2 INJECTION INTRAMUSCULAR EVERY 30 MIN PRN
Status: DISCONTINUED | OUTPATIENT
Start: 2024-09-02 | End: 2024-09-03 | Stop reason: HOSPADM

## 2024-09-02 RX ORDER — AMOXICILLIN 250 MG
1 CAPSULE ORAL 2 TIMES DAILY PRN
Status: DISCONTINUED | OUTPATIENT
Start: 2024-09-02 | End: 2024-09-03 | Stop reason: HOSPADM

## 2024-09-02 RX ORDER — FLUMAZENIL 0.1 MG/ML
0.2 INJECTION, SOLUTION INTRAVENOUS
Status: DISCONTINUED | OUTPATIENT
Start: 2024-09-02 | End: 2024-09-03 | Stop reason: HOSPADM

## 2024-09-02 RX ORDER — MULTIPLE VITAMINS W/ MINERALS TAB 9MG-400MCG
1 TAB ORAL ONCE
Status: COMPLETED | OUTPATIENT
Start: 2024-09-02 | End: 2024-09-02

## 2024-09-02 RX ORDER — CALCIUM CARBONATE 500 MG/1
1000 TABLET, CHEWABLE ORAL 4 TIMES DAILY PRN
Status: DISCONTINUED | OUTPATIENT
Start: 2024-09-02 | End: 2024-09-03 | Stop reason: HOSPADM

## 2024-09-02 RX ORDER — HALOPERIDOL 5 MG/ML
2.5-5 INJECTION INTRAMUSCULAR EVERY 6 HOURS PRN
Status: DISCONTINUED | OUTPATIENT
Start: 2024-09-02 | End: 2024-09-03 | Stop reason: HOSPADM

## 2024-09-02 RX ORDER — FOLIC ACID 1 MG/1
1 TABLET ORAL ONCE
Status: COMPLETED | OUTPATIENT
Start: 2024-09-02 | End: 2024-09-02

## 2024-09-02 RX ORDER — POLYETHYLENE GLYCOL 3350 17 G
2 POWDER IN PACKET (EA) ORAL
Status: DISCONTINUED | OUTPATIENT
Start: 2024-09-02 | End: 2024-09-03 | Stop reason: HOSPADM

## 2024-09-02 RX ADMIN — POTASSIUM & SODIUM PHOSPHATES POWDER PACK 280-160-250 MG 2 PACKET: 280-160-250 PACK at 13:33

## 2024-09-02 RX ADMIN — LORAZEPAM 1 MG: 1 TABLET ORAL at 22:46

## 2024-09-02 RX ADMIN — DIAZEPAM 10 MG: 5 INJECTION INTRAMUSCULAR; INTRAVENOUS at 11:22

## 2024-09-02 RX ADMIN — ONDANSETRON 4 MG: 2 INJECTION INTRAMUSCULAR; INTRAVENOUS at 11:14

## 2024-09-02 RX ADMIN — ENOXAPARIN SODIUM 40 MG: 100 INJECTION SUBCUTANEOUS at 20:04

## 2024-09-02 RX ADMIN — Medication 100 MG: at 11:23

## 2024-09-02 RX ADMIN — CLONIDINE HYDROCHLORIDE 0.1 MG: 0.1 TABLET ORAL at 15:39

## 2024-09-02 RX ADMIN — NICOTINE POLACRILEX 2 MG: 2 GUM, CHEWING BUCCAL at 20:09

## 2024-09-02 RX ADMIN — Medication 1 TABLET: at 11:23

## 2024-09-02 RX ADMIN — CLONIDINE HYDROCHLORIDE 0.1 MG: 0.1 TABLET ORAL at 23:46

## 2024-09-02 RX ADMIN — NICOTINE POLACRILEX 2 MG: 2 GUM, CHEWING BUCCAL at 17:54

## 2024-09-02 RX ADMIN — SODIUM CHLORIDE 1000 ML: 9 INJECTION, SOLUTION INTRAVENOUS at 11:15

## 2024-09-02 RX ADMIN — LORAZEPAM 1 MG: 2 INJECTION INTRAMUSCULAR; INTRAVENOUS at 17:59

## 2024-09-02 RX ADMIN — NICOTINE 1 PATCH: 21 PATCH, EXTENDED RELEASE TRANSDERMAL at 17:01

## 2024-09-02 RX ADMIN — FOLIC ACID 1 MG: 1 TABLET ORAL at 11:27

## 2024-09-02 RX ADMIN — SODIUM CHLORIDE: 9 INJECTION, SOLUTION INTRAVENOUS at 17:01

## 2024-09-02 ASSESSMENT — LIFESTYLE VARIABLES
ANXIETY: MILDLY ANXIOUS
ORIENTATION AND CLOUDING OF SENSORIUM: ORIENTED AND CAN DO SERIAL ADDITIONS
AUDITORY DISTURBANCES: NOT PRESENT
TACTILE DISTURBANCES: VERY MILD ITCHING, PINS AND NEEDLES, BURNING OR NUMBNESS
ORIENTATION AND CLOUDING OF SENSORIUM: ORIENTED AND CAN DO SERIAL ADDITIONS
ANXIETY: 5
VISUAL DISTURBANCES: NOT PRESENT
PAROXYSMAL SWEATS: NO SWEAT VISIBLE
NAUSEA AND VOMITING: NO NAUSEA AND NO VOMITING
AUDITORY DISTURBANCES: NOT PRESENT
PAROXYSMAL SWEATS: BARELY PERCEPTIBLE SWEATING, PALMS MOIST
TREMOR: NOT VISIBLE, BUT CAN BE FELT FINGERTIP TO FINGERTIP
AGITATION: NORMAL ACTIVITY
TOTAL SCORE: 3
TREMOR: MODERATE, WITH PATIENT'S ARMS EXTENDED
TACTILE DISTURBANCES: VERY MILD ITCHING, PINS AND NEEDLES, BURNING OR NUMBNESS
NAUSEA AND VOMITING: NO NAUSEA AND NO VOMITING
HEADACHE, FULLNESS IN HEAD: NOT PRESENT
AGITATION: 2
TOTAL SCORE: 13
VISUAL DISTURBANCES: NOT PRESENT
HEADACHE, FULLNESS IN HEAD: NOT PRESENT

## 2024-09-02 ASSESSMENT — COLUMBIA-SUICIDE SEVERITY RATING SCALE - C-SSRS
TOTAL  NUMBER OF ABORTED OR SELF INTERRUPTED ATTEMPTS LIFETIME: NO
5. HAVE YOU STARTED TO WORK OUT OR WORKED OUT THE DETAILS OF HOW TO KILL YOURSELF? DO YOU INTEND TO CARRY OUT THIS PLAN?: NO
1. IN THE PAST MONTH, HAVE YOU WISHED YOU WERE DEAD OR WISHED YOU COULD GO TO SLEEP AND NOT WAKE UP?: NO
ATTEMPT LIFETIME: NO
2. HAVE YOU ACTUALLY HAD ANY THOUGHTS OF KILLING YOURSELF?: PASSIVE SI
2. HAVE YOU ACTUALLY HAD ANY THOUGHTS OF KILLING YOURSELF?: WHILE INTOXICATED
4. HAVE YOU HAD THESE THOUGHTS AND HAD SOME INTENTION OF ACTING ON THEM?: NO
6. HAVE YOU EVER DONE ANYTHING, STARTED TO DO ANYTHING, OR PREPARED TO DO ANYTHING TO END YOUR LIFE?: NO
2. HAVE YOU ACTUALLY HAD ANY THOUGHTS OF KILLING YOURSELF?: YES
2. HAVE YOU ACTUALLY HAD ANY THOUGHTS OF KILLING YOURSELF?: YES
REASONS FOR IDEATION LIFETIME: MOSTLY TO GET ATTENTION, REVENGE, OR A REACTION FROM OTHERS
2. HAVE YOU ACTUALLY HAD ANY THOUGHTS OF KILLING YOURSELF IN THE PAST MONTH?: NO
3. HAVE YOU BEEN THINKING ABOUT HOW YOU MIGHT KILL YOURSELF?: YES
5. HAVE YOU STARTED TO WORK OUT OR WORKED OUT THE DETAILS OF HOW TO KILL YOURSELF? DO YOU INTEND TO CARRY OUT THIS PLAN?: NO
4. HAVE YOU HAD THESE THOUGHTS AND HAD SOME INTENTION OF ACTING ON THEM?: NO
1. IN THE PAST MONTH, HAVE YOU WISHED YOU WERE DEAD OR WISHED YOU COULD GO TO SLEEP AND NOT WAKE UP?: YES
1. IN YOUR LIFETIME, HAVE YOU WISHED YOU WERE DEAD OR WISHED YOU COULD GO TO SLEEP AND NOT WAKE UP?: PASSIVE SI
REASONS FOR IDEATION PAST MONTH: MOSTLY TO GET ATTENTION, REVENGE, OR A REACTION FROM OTHERS
TOTAL  NUMBER OF INTERRUPTED ATTEMPTS LIFETIME: NO
6. HAVE YOU EVER DONE ANYTHING, STARTED TO DO ANYTHING, OR PREPARED TO DO ANYTHING TO END YOUR LIFE?: NO
1. HAVE YOU WISHED YOU WERE DEAD OR WISHED YOU COULD GO TO SLEEP AND NOT WAKE UP?: YES

## 2024-09-02 ASSESSMENT — ACTIVITIES OF DAILY LIVING (ADL)
ADLS_ACUITY_SCORE: 33
ADLS_ACUITY_SCORE: 18
ADLS_ACUITY_SCORE: 18
ADLS_ACUITY_SCORE: 35
ADLS_ACUITY_SCORE: 18
ADLS_ACUITY_SCORE: 35
ADLS_ACUITY_SCORE: 18
ADLS_ACUITY_SCORE: 18
ADLS_ACUITY_SCORE: 35
ADLS_ACUITY_SCORE: 18
ADLS_ACUITY_SCORE: 18
ADLS_ACUITY_SCORE: 35
ADLS_ACUITY_SCORE: 18

## 2024-09-02 NOTE — ED PROVIDER NOTES
"EMERGENCY DEPARTMENT ENCOUNTER      NAME: Oli Akins  AGE: 33 year old male  YOB: 1990  MRN: [id number]  EVALUATION DATE & TIME: 9/2/2024 10:50 AM    PCP: No Ref-Primary, Physician    ED PROVIDER: Prosper Henao M.D.      Chief Complaint   Patient presents with    Alcohol Problem    Anxiety         IMPRESSION  1. Alcohol withdrawal syndrome with complication (H)    2. Suicidal ideation    3. Alcohol abuse    4. Hypophosphatemia        PLAN  - admit to hospitalist for further care with DEC consulting; med/surg tele admit    ED COURSE & MEDICAL DECISION MAKING    ED Course as of 09/02/24 1258   Mon Sep 02, 2024   1251 33yoM with history of alcohol abuse presenting per EMS from home with his wife for evaluation of alcohol withdrawal, nausea, vomiting, & anxiety. Reports heavy drinking \"for years\" and increased drinking over the past 1 week with nausea & persistent vomiting the past 3 days. No alcohol for 24 hours prior to arrival but feeling worse with anxiety & shakes. Denies history of alcohol withdrawal seizures. No chest pain or shortness of breath. Denies other drug use. Reports abdominal cramping with vomiting but no ongoing pain.    Wife also concerned about suicidal ideation as patient stated he was suicidal with plan to \"swallow a bullet and end it all\" earlier this week. Wife thus hid all of their firearms at home.    HR 130s, SBP 150s on presentation with otherwise normal vitals. Anxious on exam with mild diffuse tremor, clear lungs bilaterally, no abdominal tenderness, nonfocal neuro exam.    In alcohol withdrawal. Given Valium per high CIWA for improvement. Labs with negative BAL, unremarkable bilirubin/LFTs/lipase, mild acidosis suspect related to dehydration, no leukocytosis with CRP within normal limits, no SHELBY, phos 1.3 (replaced PO).    Not medically cleared from withdrawal standpoint. Consulted hospitalist for admission; they agreed. Patient understood and agreed with the " plan; no further questions at the time of admission.    DEC order placed from psych standpoint as well; planning to see the patient between 12:45pm-1pm today. Patient not actively suicidal here now; calm & cooperative with staff.       --------------------------------------------------------------------------------   --------------------------------------------------------------------------------     10:53 AM I met with the patient for the initial interview and physical examination. Discussed plan for treatment and workup in the ED.    12:31 PM Spoke with Dr. Ortiz, Hospitalist.     This patient involved a high degree of complexity in medical decision making, as significant risks were present and assessed. Recent encounters & results in medical record reviewed by me.    All workup (i.e. any EKG/labs/imaging as per charting below) reviewed and independently interpreted by me. See respective sections for details.        See additional MDM below if interested.      MEDICATIONS GIVEN IN THE EMERGENCY DEPARTMENT  Medications   OLANZapine zydis (zyPREXA) ODT half-tab 5-10 mg (has no administration in time range)     Or   haloperidol lactate (HALDOL) injection 2.5-5 mg (has no administration in time range)   flumazenil (ROMAZICON) injection 0.2 mg (has no administration in time range)   melatonin tablet 5 mg (has no administration in time range)   diazepam (VALIUM) tablet 10 mg ( Oral See Alternative 9/2/24 1122)     Or   diazepam (VALIUM) injection 5-10 mg (10 mg Intravenous $Given 9/2/24 1122)   potassium & sodium phosphates (NEUTRA-PHOS) Packet 2 packet (has no administration in time range)   ondansetron (ZOFRAN) injection 4 mg (4 mg Intravenous $Given 9/2/24 1114)   sodium chloride 0.9% BOLUS 1,000 mL (1,000 mLs Intravenous $New Bag 9/2/24 1115)   thiamine (B-1) tablet 100 mg (100 mg Oral $Given 9/2/24 1123)   folic acid (FOLVITE) tablet 1 mg (1 mg Oral $Given 9/2/24 1127)   multivitamin w/minerals (THERA-VIT-M)  "tablet 1 tablet (1 tablet Oral $Given 9/2/24 1123)               =================================================================      HPI  Use of : Jennifer Akins is a 33 year old male with no pertinent history who presents to this ED for evaluation of alcohol problem and anxiety.    Per patient and wife,  Patient has been drinking everyday for the past 4-5 years but notes he has been trying to quit since January. Patient typically drinks a pint or more of alcohol per day. He indicates he was sober for 14 days around 6-8 months ago. Patient presents today with drinking more alcohol than usual, around half a liter of Sallie, after an argument 2 days ago. Since then, patient endorses vomiting, nausea, abdominal cramping, shaking, chills, feeling cold, sweating, and feeling like his \"body is shutting down.\" He denies any blood in emesis. He also reports kidney pain but notes that this has been occurring since his last ED visit here when he hurt his back moving a couch around 6 weeks ago. Wife reports patient has not drank any alcohol in the past 24 hours. She reports that the patent had a panic attack when she recommended patient presented to the ED today due to symptoms. Wife notes patient has severe anxiety that is undiagnosed and is not been currently treated for this. No other complaints or concerns at this time.       --------------- MEDICAL HISTORY ---------------  PAST MEDICAL HISTORY:  Reviewed independently by me.  No past medical history on file.  Patient Active Problem List   Diagnosis    Suicidal ideation    Alcohol abuse    Hypophosphatemia    Alcohol withdrawal syndrome with complication (H)       PAST SURGICAL HISTORY:  Reviewed independently by me.  No past surgical history on file.    CURRENT MEDICATIONS:    Reviewed independently by me.    Current Facility-Administered Medications:     diazepam (VALIUM) tablet 10 mg, 10 mg, Oral, Q30 Min PRN **OR** diazepam (VALIUM) " injection 5-10 mg, 5-10 mg, Intravenous, Q30 Min PRN, Prosper Henao MD, 10 mg at 09/02/24 1122    flumazenil (ROMAZICON) injection 0.2 mg, 0.2 mg, Intravenous, q1 min prn, Prosper Henao MD    OLANZapine zydis (zyPREXA) ODT half-tab 5-10 mg, 5-10 mg, Oral, Q6H PRN **OR** haloperidol lactate (HALDOL) injection 2.5-5 mg, 2.5-5 mg, Intravenous, Q6H PRN, Prosper Henao MD    melatonin tablet 5 mg, 5 mg, Oral, QPM PRN, Prosper Henao MD    potassium & sodium phosphates (NEUTRA-PHOS) Packet 2 packet, 2 packet, Oral, Once, Prosper Henao MD    Current Outpatient Medications:     cyclobenzaprine (FLEXERIL) 10 MG tablet, Take 10 mg by mouth 3 times daily as needed for muscle spasms., Disp: , Rfl:     ibuprofen (ADVIL/MOTRIN) 200 MG tablet, Take 400 mg by mouth every 4 hours as needed for pain., Disp: , Rfl:     ketorolac (TORADOL) 10 MG tablet, Take 1 tablet (10 mg) by mouth every 8 hours as needed for moderate pain, Disp: 20 tablet, Rfl: 0    UNABLE TO FIND, Take 1 tablet by mouth daily. MEDICATION NAME: naturopathic OTC that has [facility] in it, Disp: , Rfl:     ALLERGIES:  Reviewed independently by me.  No Known Allergies    FAMILY HISTORY:  Reviewed independently by me.  Family History   Problem Relation Age of Onset    Coronary Artery Disease Father        SOCIAL HISTORY:   Reviewed independently by me.  Social History     Socioeconomic History    Marital status: Single   Tobacco Use    Smoking status: Every Day     Types: Cigarettes   Substance and Sexual Activity    Drug use: Yes     Types: Marijuana   Social History Narrative    6/22/2015: Patient is currently employed.       --------------- PHYSICAL EXAM ---------------  Nursing notes and vitals independently reviewed by me.  VITALS:  Vitals:    09/02/24 1115 09/02/24 1130 09/02/24 1145 09/02/24 1200   BP: (!) 148/87 (!) 166/94  (!) 153/73   Pulse: 95 93 78 75   Resp: 21 23 24 24   Temp:       TempSrc:       SpO2: 100% 100% 92% 99%   Weight:        Height:           PHYSICAL EXAM:    General:  alert, interactive, distress, anxious, crying, mild diffuse tremors   Eyes:  conjunctivae clear, conjugate gaze  HENT:  atraumatic, nose with no rhinorrhea, oropharynx clear  Neck:  no meningismus  Cardiovascular:  HR 120s during exam, regular rhythm, no murmurs, brisk cap refill  Chest:  no chest wall tenderness  Pulmonary:  no stridor, normal phonation, normal work of breathing, clear lungs bilaterally  Abdomen:  soft, nondistended, nontender  :  no CVA tenderness  Back:  no midline spinal tenderness  Musculoskeletal:  no pretibial edema, no calf tenderness. Gross ROM intact to joints of extremities with no obvious deformities.  Skin:  warm, dry, no rash  Neuro:  awake, alert, answers questions appropriately, follows commands, moves all limbs, mild diffuse tremors  Psych:  anxious affect      --------------- RESULTS ---------------  EKG:    Reviewed and independently interpreted by me.  - NSR at 86bpm, no ST or T wave changes, normal intervals  - unchanged from prior on 8/6/15  My read.    LAB:  Reviewed and independently interpreted by me.  Results for orders placed or performed during the hospital encounter of 09/02/24   Result Value Ref Range    INR 1.04 0.85 - 1.15   Basic metabolic panel   Result Value Ref Range    Sodium 138 135 - 145 mmol/L    Potassium 4.5 3.4 - 5.3 mmol/L    Chloride 96 (L) 98 - 107 mmol/L    Carbon Dioxide (CO2) 17 (L) 22 - 29 mmol/L    Anion Gap 25 (H) 7 - 15 mmol/L    Urea Nitrogen 17.2 6.0 - 20.0 mg/dL    Creatinine 0.90 0.67 - 1.17 mg/dL    GFR Estimate >90 >60 mL/min/1.73m2    Calcium 9.8 8.8 - 10.4 mg/dL    Glucose 99 70 - 99 mg/dL   Hepatic function panel   Result Value Ref Range    Protein Total 7.8 6.4 - 8.3 g/dL    Albumin 4.8 3.5 - 5.2 g/dL    Bilirubin Total 1.6 (H) <=1.2 mg/dL    Alkaline Phosphatase 82 40 - 150 U/L    AST      ALT 49 0 - 70 U/L    Bilirubin Direct     Result Value Ref Range    Lipase 39 13 - 60 U/L   Result  Value Ref Range    Magnesium 1.8 1.7 - 2.3 mg/dL   Result Value Ref Range    CRP Inflammation <3.00 <5.00 mg/L   Ethyl Alcohol Level   Result Value Ref Range    Alcohol ethyl <0.01 <=0.01 g/dL   Result Value Ref Range    Phosphorus 1.3 (L) 2.5 - 4.5 mg/dL   CBC with platelets and differential   Result Value Ref Range    WBC Count 5.5 4.0 - 11.0 10e3/uL    RBC Count 5.21 4.40 - 5.90 10e6/uL    Hemoglobin 16.5 13.3 - 17.7 g/dL    Hematocrit 45.8 40.0 - 53.0 %    MCV 88 78 - 100 fL    MCH 31.7 26.5 - 33.0 pg    MCHC 36.0 31.5 - 36.5 g/dL    RDW 13.3 10.0 - 15.0 %    Platelet Count 340 150 - 450 10e3/uL    % Neutrophils 57 %    % Lymphocytes 28 %    % Monocytes 11 %    % Eosinophils 2 %    % Basophils 1 %    % Immature Granulocytes 0 %    NRBCs per 100 WBC 0 <1 /100    Absolute Neutrophils 3.1 1.6 - 8.3 10e3/uL    Absolute Lymphocytes 1.5 0.8 - 5.3 10e3/uL    Absolute Monocytes 0.6 0.0 - 1.3 10e3/uL    Absolute Eosinophils 0.1 0.0 - 0.7 10e3/uL    Absolute Basophils 0.1 0.0 - 0.2 10e3/uL    Absolute Immature Granulocytes 0.0 <=0.4 10e3/uL    Absolute NRBCs 0.0 10e3/uL   ECG 12-LEAD WITH MUSE (LHE)   Result Value Ref Range    Systolic Blood Pressure  mmHg    Diastolic Blood Pressure  mmHg    Ventricular Rate 86 BPM    Atrial Rate 86 BPM    MO Interval 128 ms    QRS Duration 104 ms     ms    QTc 452 ms    P Axis 77 degrees    R AXIS 25 degrees    T Axis -7 degrees    Interpretation ECG       Sinus rhythm with sinus arrhythmia  T wave abnormality, consider inferior ischemia  Abnormal ECG  When compared with ECG of 06-Aug-2015 17:35,  Vent. rate has increased by  33 bpm  QT has lengthened  Confirmed by SEE ED PROVIDER NOTE FOR, ECG INTERPRETATION ([id number]),  Meseret Clancy ([id number]) on 9/2/2024 12:13:22 PM           PROCEDURES:   Procedures   --------------------------------------------------------------------------------   Cardiac telemetry monitoring ordered by me secondary to the patient's history of alcohol  withdrawal with tachycardia,  and to monitor the patient for dysrhythmia. Reviewed & independently interpreted by me. Revealed sinus rhythm.  --------------------------------------------------------------------------------             --------------- ADDITIONAL MDM ---------------  No MIPS measures identified.    History:  - I considered systemic symptoms of the presenting illness.  - Supplemental history from:       -- patient, wife  - External Record(s) reviewed:       -- Inpatient/outpatient record, prior labs, prior imaging       -- see above ED course & MDM for further details    Workup:  - Chart documentation above includes differential considered and any EKGs or imaging independently interpreted by provider.  - In additional to work up documented, I considered the following work up:       -- see above ED course & MDM for further details    External Consultation:  - Discussion of management with another provider:       -- see above charting for additional    Complicating Factors:  - Care impacted by chronic illness:       -- see above MDM, past medical history, & problem list  - Care affected by social determinants of health:       -- see above social history       -- Access to Affordable Healthcare       -- Access to Medical Care    Disposition Considerations:  - Admit           I, Lauryn Singleton, am serving as a scribe to document services personally performed by Dr. Prosper Henao based on my observation and the provider's statements to me. I, Prosper Henao MD attest that Lauryn Singleton is acting in a scribe capacity, has observed my performance of the services and has documented them in accordance with my direction.      Prosper Henao MD  09/02/24  Emergency Medicine  [facility] EMERGENCY ROOM  [address] [address] [phone]  Dept: [phone]     Prosper Henao MD  09/02/24 [id number]

## 2024-09-02 NOTE — PHARMACY-ADMISSION MEDICATION HISTORY
Pharmacist Admission Medication History    Admission medication history is complete. The information provided in this note is only as accurate as the sources available at the time of the update.    Information Source(s): Patient, Family member, and CareEverywhere/SureScripts via in-person    Pertinent Information: Patient reports rarely taking medication. Visitor in room states they sometimes take a natural supplement, but unable to remember the name. Patient still has Rxs from 7/11/24 (cyclobenzaprine, ketorolac) from a back injury but has not used recently - left on file.    Changes made to PTA medication list:  Added: cyclobenzaprine, ibuprofen  Deleted: naproxen, senna-docusate  Changed: None    Allergies reviewed with patient and updates made in EHR: yes    Medication History Completed By: Rekha Ramirez PharmD 9/2/2024 11:24 AM    PTA Med List   Medication Sig Last Dose    cyclobenzaprine (FLEXERIL) 10 MG tablet Take 10 mg by mouth 3 times daily as needed for muscle spasms. Unknown at prn    ibuprofen (ADVIL/MOTRIN) 200 MG tablet Take 400 mg by mouth every 4 hours as needed for pain. Unknown at prn    ketorolac (TORADOL) 10 MG tablet Take 1 tablet (10 mg) by mouth every 8 hours as needed for moderate pain Unknown at prn    UNABLE TO FIND Take 1 tablet by mouth daily. MEDICATION NAME: naturopathic OTC that has [facility] in it Past Week
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
## 2024-09-02 NOTE — ED NOTES
Unable to perform mental health screening at this time due to the Pts state. Will attempt at a later time when PT is able to answer questions approprietly.
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
## 2024-09-02 NOTE — ED TRIAGE NOTES
PT presents to the ED with c/o alcohol problem and anxiety. Pt states that two days ago (last drink), he drank a half liter of maliha. Since then has had N/V, has not been able to keep anything down. Pt endorses high anxiety. Pt states he drinks every day, roughly one pint of maliha a day. Denies SI. Denies hx of seizures.      Triage Assessment (Adult)       Row Name 09/02/24 1046          Triage Assessment    Airway WDL WDL        Respiratory WDL    Respiratory WDL WDL        Skin Circulation/Temperature WDL    Skin Circulation/Temperature WDL WDL        Cardiac WDL    Cardiac WDL WDL        Peripheral/Neurovascular WDL    Peripheral Neurovascular WDL WDL        Cognitive/Neuro/Behavioral WDL    Cognitive/Neuro/Behavioral WDL WDL

## 2024-09-02 NOTE — ED NOTES
"PT spouse spoke with the writer and stated the PT has been drinking more than usual. She also stated that the PT has made multiple suicidal thought comments in the past month. She states that he has told her \"I want to die. I want to swallow a bullet.\" Per spouse these comments has led to her having to get a locked safe for the guns in their home so that he could not have access to them as she fears he will attempt suicide with the firearms.  "

## 2024-09-02 NOTE — PLAN OF CARE
Oli ALLIE Tashi  September 2, 2024  Plan of Care Hand-off Note     Patient Care Path: medical    Plan for Care:   Pt will be medically admitted for alcohol detox. Pt adamently denies being suicidal, he denies SIB, HI, hallucinations and previous suide attempts, he reports he has a lot to live for especially his daughters. Pt would like support from alcohol treatment and individual therapy. An appointment with the [facility] was made, pt would benefit from following through with these appointments and a committment to sobriety. Pt also plans to give access to his firearms to his father and mansoor so he no longer has access.    Identified Goals and Safety Issues: medical relief from alcohol detox, safety and stability    Overview:  Silvestre Akins (Father)  [phone] (Mobile) mansoor Lam     Dial numbers for the patient - do not hand them a phone and walk away    Legal Status:      Psychiatry Consult: yes       Updated   regarding plan of care.           RIGOBERTO Williamson

## 2024-09-02 NOTE — DISCHARGE INSTRUCTIONS
MENTAL HEALTH RESOURCES & SERVICES:   Behavioral Healthcare Providers Scheduling  During your hospitalization, you were seen by a licensed mental health professional through [facility] (P)  for a brief mental health assessment and/or psychotherapy at [facility].  It is recommended that you follow up with your established providers (psychiatrist, mental health therapist, and/or primary care doctor - as relevant) as soon as possible. Coordinators from [facility] will be calling you in the next 24-48 hours to ensure that you have the resources you need.  You can also contact [facility] coordinators directly at [phone].    You have been scheduled for the following mental health appointments:     Date: Monday, 9/9/2024  Time: 11:00 am - 11:59 am  Provider: Jolene PROCTOR  CNP,RN  Location: [facility], [address] [place] [address], [place] [address]  Phone: [phone]  Type: Telepsychiatry    Patient instructions  Please have the patient check their email for an invitation from [facility]. The patients will receive documents to complete before their appointments. The paperwork should be completed no later than 24 hours before so the provider has time to review it. They may call our office at [phone] or email us at [email or web address] and one of our providers or administrative assistants will get back to the patient with any questions, concerns, or need to reschedule. Thank you.    **You must call at least 72 hours prior to appointment to provide a valid email address or your appointment may be cancelled**       [facility] maintains an extensive network of licensed behavioral health providers to connect patients with the services they need.  We do not charge providers a fee to participate in our referral network.  We match patients with providers based on a patient s  specific needs, insurance coverage, and location.  Our first effort will be to refer you to a provider within your care system, and will utilize providers outside your care system as needed.            Resources  Substance Use Disorder Direct Access Resources    It is recommended that you abstain from all mood altering chemicals. Please contact the sober support hotline ([phone]) as needed; phones are answered 24 hours a day, 7 days a week.    To access substance use treatment you must have a comprehensive assessment completed to begin any treatment program.     If uninsured, please contact your county of residence for eligibility screen to substance use disorder evaluation and treatment:    [place] - [phone]   [place]  [phone]   [facility] [phone]   [place]  [phone]   [facility] [phone]   [place] - [phone]   [facility] [phone]   [place] - [phone]     If you have private insurance, call the customer service number on the back of your insurance card to find an in-network substance abuse use disorder assessment. The ideal provider will be a treatment facility, licensed in the [facility].     Community JOSHUA Evaluations: Clients may call their county for a full list of providers - Availability and services listed belo are subject to change, please call the provider to confirm    [facility]  [phone]  [address] [place] [address], [address]  *Please call the above number to schedule a comprehensive assessment for determination of level of care needs. In person and virtual appointments available Mon-Fri.    [facility], [facility] [address], First Floor, [address], [place] [address] (next to the outpatient lab)    Phone: [phone]   Provides bridging services to people with Opiate Use Disorders (OUD) seeking care. This is a front door to Medication Assisted Treatments (MAT), ages 16+  Walk In hours: Monday-Friday  9:00am-3:00pm    [facility]  [phone]  Walk in Assessments: Mon-Friday 7a-1:45p  [address] [facility], [address]    [facility]  Central Access [phone]  [address] [place], [place], [address]  *by appointment only    [facility]  [phone] (phone consultation available )  Locations in: [place], [place], [facility], and [place], [place]  [place] virtual IOP [phone] or visit [email or web address]   Also offers LGBTQ programming     [facility]  [phone]  [address]  [place], [place], [address]  *Currently only offered via telehealth - call to set up an appointment    [facility]  [address] [place] [address], [address]  [facility] Recovery Program  For more information to to make a referral call:  [phone]  Walk-in on   9-11 a.m.    [facility]  [phone]  [address] [place], [place], [address]  *available by appointments only    [facility]  [phone]  [address] [place], [place], [address]  *available by appointment only    [facility]  [phone]  [address] [place], [place], [address]  *walk in assessments available M-F starting at 7 am.    [facility]  [phone]  Locations: [facility], [facility], and [place]  *Walk in assessments availble M-F starting at 8 am -virtual only    [facility]  [phone]  [address] [place] [address]    [facility]  Virtual + Locations: [place], [place], [place], [place], [facility], [facility], [place], [place]   [phone]  *available by appointment only    [facility]  [phone]  [address]  [place], [address]    [facility]  [phone]  [address]  [place], [place], [address]  *available by appointment    [facility]  [phone]  [address] [place], [place], [address]  *walk ins available M-TH from 9-3    [facility]  MAT program: [phone]  [address] [place], [place], [address]    [place]  [phone]  Same day substance use disorder assessments are available Monday - Friday, via walk-in or by appointment at the [place] location.  [address], [address], [place] [address]     [facility] - adolescent and adult SUDs services  [phone]  Offer services Monday through Friday, as well as evening hours Monday through Thursday. Normally, a first appointment will be scheduled within one week  [email or web address]  Locations all over [place]    If you are intoxicated, you may be required to detox at a detox facility before starting treatment. The following are detox facilities that you can self present to. All detox facilities are able to help you complete an assessment prior to discharge if you choose:    [place] [facility]: Arrive at a [facility] for immediate medical evaluation    [facility]: [address] [place], [place], [address].         [phone]    [facility]: [address] [place], [place], [address]  [phone]     [facility] ([facility]): [address] [place], [place], [address] [phone]     [facility]: [address] [place], [place], [address], [phone]         Ways to help cope with sobriety:    -- Take prescribed medicines as scheduled  -- Keep follow-up appointments  -- Talk to others about your concerns  -- Get regular exercise  -- Practice deep breathing skills  -- Eat a healthy diet  -- Use community resources, including hotline numbers, [facility] crisis and support meetings  -- Stay sober and avoid places/people/things associated with substance use  --Maintain a daily schedule/routine  --Get at least 7-8 hours of sleep per night  --Create a list 10--20  healthy activities that you can do that are enjoyable and do not involve substance use  --Create daily goals (approx. 1-4 goals) per day and work to achieve them throughout the day.       Free Resources:    [facility] ([facility])  [facility] connects people seeking recovery to resources that help foster and sustain long-term recovery. Whether you are seeking resources for treatment, transportation, housing, job training, education, health care or other pathways to recovery, [facility] is a great place to start.  Phone: [phone]. www.[employer].Reclip.It (Great listing of all types of recovery and non-recovery related resources)    Alcoholics Anonymous  Phone: 0-224-ALCOHOL  Website: HTTP://WWW.AA.ORG/  [facility] [place] (023-960-4894 or http://aaDelphix.org)  [facility] [place] (806-182-1786 or www.aastpaul.org)     Narcotics Anonymous  Phone: 417.968.1155  Website: www.[employer].    [facility] [address], [place], [place],  Phone: [phone]  Drop-in Hours: Monday-Friday 9-11:30 am. By appointment at other times.  Provides: [facility] is a drop-in center on the [facility] that provides a safe space for individuals who are homeless and have a history of chemical use. Sobriety is not a requirement but drugs and alcohol are not allowed on the property.  Services: Non-clients can access drop-in services such as Recovery and Harm Reduction Groups, referrals to case management, community activities, shower facilities, and a pool table. Individuals who are homeless and have chemical health needs may be eligible for enrollment into [facility]'s case management program. Clients and  work together to access benefits, treatment, health care, shelter, and external housing resources.      Resources:    Free Counseling Services  COUNSELING SERVICES ARE COMPLETELY FREE AND ANONYMOUS. Walk-In is a non-profit, non-Jew organization, All of our  "professional counselors volunteer their time. Counseling is for individuals, couples, or families. No appointment (during clinics) or insurance is needed.    CLINIC HOURS: Please come/call/login only during clinic hours.  M, W, F:  1:00p - 3:00p for both in-person and virtual (phone and login) services    M, T, W, Th: 5:30p - 7:30p virtual services only    IN PERSON SERVICES: Come to [address] [place] [address] on Monday, Wednesday or Friday from 1 - 3 pm.    BY PHONE: Call Zoom at [phone]. When prompted, enter the meeting ID: [phone].    BY COMPUTER: Go to WalkMe.us/j/348294564    ONE TAP MOBILE ON [employer] PHONES: To attend a clinic using the smart cell phone  one-tap mobile  button, click on this link and press the dial button on your phone:  [phone]  When Zoom answers, it may ask for a meeting id (you won t have one), so just wait until it asks you to simply press the # (number) button.    If the phone line is busy, try the phone numbers below. Try each phone number. Or, use landline phone-in instructions below.  [phone]  [phone]  [phone]  [phone]  [phone]    Services for Upper sorbian Speakers  Services for Upper sorbian speakers remain the same.  The client can simply call our main number ([phone]), dial extension 2 and leave a message with the call-back phone number.    Counseling by Appointment  If you are interested in ongoing counseling, the first step is to see a counselor at a clinic. Ongoing counseling is arranged by request during the clinic.    Free Counseling Services  Counseling services are completely free and anonymous, with no appointment needed. SOME CLINICS ARE NOW IN PERSON! All of our professional counselors volunteer their time.    [place] [facility]  [phone] or [phone]  [email or web address]  [address], [address]  [facility] [address]  Call [phone]  Text \"Support\" to " [address]    CRISIS:  Text or Call 876

## 2024-09-02 NOTE — PLAN OF CARE
Patient admitted from the ED, CIWA scores of 6 (1500) and 4 (1700). Requesting Nicotine patch and gum due to increased anxiety at being hospitalized and having a 1:1 for suicidal ideation. At 1700 significant other, Carole, and the patient asking about lab values and plan going forward. This RN discussed getting through the withdrawal period, psychiatry talking with patient, working on a plan for sobriety and next steps, getting his  lab values in normal range and getting questions answered.     Patient tearful about missing his daughters and concerned about ketones in his urine. Patient reported he had the same result at a physical recently. Dr. Ortiz updated and ordered Nicotine products.  Jolene Montes RN on 9/2/2024 at 5:24 PM

## 2024-09-03 VITALS
WEIGHT: 171.6 LBS | RESPIRATION RATE: 16 BRPM | OXYGEN SATURATION: 96 % | HEIGHT: 70 IN | TEMPERATURE: 98 F | HEART RATE: 65 BPM | SYSTOLIC BLOOD PRESSURE: 117 MMHG | DIASTOLIC BLOOD PRESSURE: 69 MMHG | BODY MASS INDEX: 24.57 KG/M2

## 2024-09-03 LAB
ALBUMIN SERPL BCG-MCNC: 3.9 G/DL (ref 3.5–5.2)
ALP SERPL-CCNC: 73 U/L (ref 40–150)
ALT SERPL W P-5'-P-CCNC: 43 U/L (ref 0–70)
ANION GAP SERPL CALCULATED.3IONS-SCNC: 10 MMOL/L (ref 7–15)
AST SERPL W P-5'-P-CCNC: 41 U/L (ref 0–45)
B-OH-BUTYR SERPL-SCNC: 0.66 MMOL/L
BILIRUB SERPL-MCNC: 0.7 MG/DL
BUN SERPL-MCNC: 14 MG/DL (ref 6–20)
CALCIUM SERPL-MCNC: 8.3 MG/DL (ref 8.8–10.4)
CHLORIDE SERPL-SCNC: 104 MMOL/L (ref 98–107)
CREAT SERPL-MCNC: 0.78 MG/DL (ref 0.67–1.17)
EGFRCR SERPLBLD CKD-EPI 2021: >90 ML/MIN/1.73M2
GLUCOSE SERPL-MCNC: 106 MG/DL (ref 70–99)
HCO3 SERPL-SCNC: 22 MMOL/L (ref 22–29)
MAGNESIUM SERPL-MCNC: 2 MG/DL (ref 1.7–2.3)
PHOSPHATE SERPL-MCNC: 3.5 MG/DL (ref 2.5–4.5)
POTASSIUM SERPL-SCNC: 3.5 MMOL/L (ref 3.4–5.3)
PROT SERPL-MCNC: 6.1 G/DL (ref 6.4–8.3)
SODIUM SERPL-SCNC: 136 MMOL/L (ref 135–145)

## 2024-09-03 PROCEDURE — 250N000013 HC RX MED GY IP 250 OP 250 PS 637: Performed by: REGISTERED NURSE

## 2024-09-03 PROCEDURE — 83735 ASSAY OF MAGNESIUM: CPT | Performed by: STUDENT IN AN ORGANIZED HEALTH CARE EDUCATION/TRAINING PROGRAM

## 2024-09-03 PROCEDURE — 36415 COLL VENOUS BLD VENIPUNCTURE: CPT | Performed by: STUDENT IN AN ORGANIZED HEALTH CARE EDUCATION/TRAINING PROGRAM

## 2024-09-03 PROCEDURE — 99239 HOSP IP/OBS DSCHRG MGMT >30: CPT | Performed by: STUDENT IN AN ORGANIZED HEALTH CARE EDUCATION/TRAINING PROGRAM

## 2024-09-03 PROCEDURE — 258N000003 HC RX IP 258 OP 636: Performed by: STUDENT IN AN ORGANIZED HEALTH CARE EDUCATION/TRAINING PROGRAM

## 2024-09-03 PROCEDURE — C7900 HC HOPD MH 15-29 MINS: HCPCS | Performed by: COUNSELOR

## 2024-09-03 PROCEDURE — 84100 ASSAY OF PHOSPHORUS: CPT | Performed by: STUDENT IN AN ORGANIZED HEALTH CARE EDUCATION/TRAINING PROGRAM

## 2024-09-03 PROCEDURE — 82010 KETONE BODYS QUAN: CPT | Performed by: STUDENT IN AN ORGANIZED HEALTH CARE EDUCATION/TRAINING PROGRAM

## 2024-09-03 PROCEDURE — 99254 IP/OBS CNSLTJ NEW/EST MOD 60: CPT | Mod: 95 | Performed by: REGISTERED NURSE

## 2024-09-03 PROCEDURE — 250N000013 HC RX MED GY IP 250 OP 250 PS 637: Performed by: STUDENT IN AN ORGANIZED HEALTH CARE EDUCATION/TRAINING PROGRAM

## 2024-09-03 PROCEDURE — 80053 COMPREHEN METABOLIC PANEL: CPT | Performed by: STUDENT IN AN ORGANIZED HEALTH CARE EDUCATION/TRAINING PROGRAM

## 2024-09-03 RX ORDER — SERTRALINE HYDROCHLORIDE 25 MG/1
25 TABLET, FILM COATED ORAL DAILY
Status: DISCONTINUED | OUTPATIENT
Start: 2024-09-03 | End: 2024-09-03 | Stop reason: HOSPADM

## 2024-09-03 RX ORDER — NALTREXONE HYDROCHLORIDE 50 MG/1
25 TABLET, FILM COATED ORAL DAILY
Qty: 7 TABLET | Refills: 0 | Status: SHIPPED | OUTPATIENT
Start: 2024-09-04

## 2024-09-03 RX ORDER — LANOLIN ALCOHOL/MO/W.PET/CERES
100 CREAM (GRAM) TOPICAL DAILY
Qty: 30 TABLET | Refills: 0 | Status: SHIPPED | OUTPATIENT
Start: 2024-09-04

## 2024-09-03 RX ORDER — SERTRALINE HYDROCHLORIDE 25 MG/1
25 TABLET, FILM COATED ORAL DAILY
Qty: 30 TABLET | Refills: 1 | Status: SHIPPED | OUTPATIENT
Start: 2024-09-04

## 2024-09-03 RX ORDER — FOLIC ACID 1 MG/1
1 TABLET ORAL DAILY
Qty: 30 TABLET | Refills: 0 | Status: SHIPPED | OUTPATIENT
Start: 2024-09-04

## 2024-09-03 RX ORDER — HYDROXYZINE HYDROCHLORIDE 25 MG/1
25 TABLET, FILM COATED ORAL EVERY 8 HOURS PRN
Qty: 10 TABLET | Refills: 0 | Status: SHIPPED | OUTPATIENT
Start: 2024-09-03

## 2024-09-03 RX ADMIN — Medication 1 TABLET: at 09:08

## 2024-09-03 RX ADMIN — SERTRALINE HYDROCHLORIDE 25 MG: 25 TABLET ORAL at 10:24

## 2024-09-03 RX ADMIN — SODIUM CHLORIDE: 9 INJECTION, SOLUTION INTRAVENOUS at 06:13

## 2024-09-03 RX ADMIN — NICOTINE POLACRILEX 2 MG: 2 GUM, CHEWING BUCCAL at 09:10

## 2024-09-03 RX ADMIN — NALTREXONE HYDROCHLORIDE 25 MG: 50 TABLET, FILM COATED ORAL at 10:24

## 2024-09-03 RX ADMIN — CLONIDINE HYDROCHLORIDE 0.1 MG: 0.1 TABLET ORAL at 09:08

## 2024-09-03 RX ADMIN — CLONIDINE HYDROCHLORIDE 0.1 MG: 0.1 TABLET ORAL at 16:03

## 2024-09-03 RX ADMIN — Medication 100 MG: at 09:08

## 2024-09-03 RX ADMIN — FOLIC ACID 1 MG: 1 TABLET ORAL at 09:08

## 2024-09-03 ASSESSMENT — ACTIVITIES OF DAILY LIVING (ADL)
ADLS_ACUITY_SCORE: 19
DEPENDENT_IADLS:: INDEPENDENT
ADLS_ACUITY_SCORE: 18
ADLS_ACUITY_SCORE: 19
ADLS_ACUITY_SCORE: 18
ADLS_ACUITY_SCORE: 19
ADLS_ACUITY_SCORE: 18
ADLS_ACUITY_SCORE: 19
ADLS_ACUITY_SCORE: 19

## 2024-09-03 NOTE — CONSULTS
Initial Psychiatric Consult   Consult date: September 3, 2024         Reason for Consult, requesting source:    Passive suicidal thoughts, anxiety, alcohol use disorder    Requesting source: Nima Ortiz    Labs and imaging reviewed. Provider contacted with recommendations.     Telemedicine Visit: The patient was seen for a visit utilizing the [employer] system. Permission from the patient to conduct the exam by telemedicine was obtained prior to proceeding.  Oli was also informed that insurance will be billed for this contact.   Patient Location):  [facility]   Provider Location: [employer]/remote  As the provider I attest to compliance with applicable laws and regulations related to telemedicine          HPI:   Psychiatry seeing patient today regarding passive suicidal thoughts, anxiety and alcohol use disorder.    Oli Akins is a 33 year old male admitted on 9/2/2024.  He has a past medical history significant for anxiety, alcohol use disorder who presented to the hospital due to alcohol withdrawal and concerns from his family of suicidal thoughts.       Per mental health assessment form 8/2: Pt will be medically admitted for alcohol detox. Pt adamently denies being suicidal, he denies SIB, HI, hallucinations and previous suide attempts, he reports he has a lot to live for especially his daughters. Pt would like support from alcohol treatment and individual therapy. [facility], pt would benefit from following through with these appointments and a committment to sobriety. Pt also plans to give access to his firearms to his father and fiance so he no longer has access.     Today, patient requests medication for management of anxiety and has not been on medication in the past. He reports that his anxiety is not constant throughout the day, but is exacerbated by stress and when there are many things going on. Patient would like to see a  therapist for individual therapy, as well as start medications for anxiety and substance-use/alcohol. He denies SI/SIB, and feels that he would be safe to discharge from the hospital.         Past Psychiatric History:   History of anxiety, but no current treatments or medications.         Substance Use and History:   Drinks around 1 pint of maliha a day.  Last alcoholic drink on 8/21  Denies any alcohol drawl seizures    Alcohol intake his increased over the past four years.         Past Medical History:   PAST MEDICAL HISTORY: No past medical history on file.    PAST SURGICAL HISTORY: No past surgical history on file.          Family History:   FAMILY HISTORY:   Family History   Problem Relation Age of Onset    Coronary Artery Disease Father            Social History:   Works full-time, has two children and is engaged (michaelivon Carole).          Physical ROS:   The 10 point Review of Systems is negative other than noted in the HPI or here.           Medications:     Current Facility-Administered Medications   Medication Dose Route Frequency Provider Last Rate Last Admin    cloNIDine (CATAPRES) tablet 0.1 mg  0.1 mg Oral Q8H Nima Ortiz MD   0.1 mg at 09/03/24 0908    enoxaparin ANTICOAGULANT (LOVENOX) injection 40 mg  40 mg Subcutaneous Q24H Nima Ortiz MD   40 mg at 09/02/24 2004    folic acid (FOLVITE) tablet 1 mg  1 mg Oral Daily Nima Ortiz MD   1 mg at 09/03/24 0908    multivitamin w/minerals (THERA-VIT-M) tablet 1 tablet  1 tablet Oral Daily Nima Ortiz MD   1 tablet at 09/03/24 0908    naltrexone (DEPADE;REVIA) half-tab 25 mg  25 mg Oral Daily Christine Koch NP        nicotine (NICODERM CQ) 21 MG/24HR 24 hr patch 1 patch  1 patch Transdermal Daily Nima Ortiz MD   1 patch at 09/02/24 1701    sertraline (ZOLOFT) tablet 25 mg  25 mg Oral Daily Christine Koch NP        sodium chloride (PF) 0.9% PF flush 3 mL  3 mL Intracatheter Q8H Nima Ortiz MD   3 mL at 09/02/24 9722     thiamine (B-1) tablet 100 mg  100 mg Oral Daily Nima Ortiz MD   100 mg at 09/03/24 0908              Allergies:   No Known Allergies       Labs:     Recent Results (from the past 48 hour(s))   ECG 12-LEAD WITH MUSE (LHE)    Collection Time: 09/02/24 11:13 AM   Result Value Ref Range    Systolic Blood Pressure  mmHg    Diastolic Blood Pressure  mmHg    Ventricular Rate 86 BPM    Atrial Rate 86 BPM    VA Interval 128 ms    QRS Duration 104 ms     ms    QTc 452 ms    P Axis 77 degrees    R AXIS 25 degrees    T Axis -7 degrees    Interpretation ECG       Sinus rhythm with sinus arrhythmia  T wave abnormality, consider inferior ischemia  Abnormal ECG  When compared with ECG of 06-Aug-2015 17:35,  Vent. rate has increased by  33 bpm  QT has lengthened  Confirmed by SEE ED PROVIDER NOTE FOR, ECG INTERPRETATION ([id number]),  Meseret Clancy ([id number]) on 9/2/2024 12:13:22 PM     INR    Collection Time: 09/02/24 11:14 AM   Result Value Ref Range    INR 1.04 0.85 - 1.15   Basic metabolic panel    Collection Time: 09/02/24 11:14 AM   Result Value Ref Range    Sodium 138 135 - 145 mmol/L    Potassium 4.5 3.4 - 5.3 mmol/L    Chloride 96 (L) 98 - 107 mmol/L    Carbon Dioxide (CO2) 17 (L) 22 - 29 mmol/L    Anion Gap 25 (H) 7 - 15 mmol/L    Urea Nitrogen 17.2 6.0 - 20.0 mg/dL    Creatinine 0.90 0.67 - 1.17 mg/dL    GFR Estimate >90 >60 mL/min/1.73m2    Calcium 9.8 8.8 - 10.4 mg/dL    Glucose 99 70 - 99 mg/dL   Hepatic function panel    Collection Time: 09/02/24 11:14 AM   Result Value Ref Range    Protein Total 7.8 6.4 - 8.3 g/dL    Albumin 4.8 3.5 - 5.2 g/dL    Bilirubin Total 1.6 (H) <=1.2 mg/dL    Alkaline Phosphatase 82 40 - 150 U/L    AST      ALT 49 0 - 70 U/L    Bilirubin Direct     Lipase    Collection Time: 09/02/24 11:14 AM   Result Value Ref Range    Lipase 39 13 - 60 U/L   Magnesium    Collection Time: 09/02/24 11:14 AM   Result Value Ref Range    Magnesium 1.8 1.7 - 2.3 mg/dL   CRP inflammation    Collection  Time: 09/02/24 11:14 AM   Result Value Ref Range    CRP Inflammation <3.00 <5.00 mg/L   Ethyl Alcohol Level    Collection Time: 09/02/24 11:14 AM   Result Value Ref Range    Alcohol ethyl <0.01 <=0.01 g/dL   Phosphorus    Collection Time: 09/02/24 11:14 AM   Result Value Ref Range    Phosphorus 1.3 (L) 2.5 - 4.5 mg/dL   CBC with platelets and differential    Collection Time: 09/02/24 11:14 AM   Result Value Ref Range    WBC Count 5.5 4.0 - 11.0 10e3/uL    RBC Count 5.21 4.40 - 5.90 10e6/uL    Hemoglobin 16.5 13.3 - 17.7 g/dL    Hematocrit 45.8 40.0 - 53.0 %    MCV 88 78 - 100 fL    MCH 31.7 26.5 - 33.0 pg    MCHC 36.0 31.5 - 36.5 g/dL    RDW 13.3 10.0 - 15.0 %    Platelet Count 340 150 - 450 10e3/uL    % Neutrophils 57 %    % Lymphocytes 28 %    % Monocytes 11 %    % Eosinophils 2 %    % Basophils 1 %    % Immature Granulocytes 0 %    NRBCs per 100 WBC 0 <1 /100    Absolute Neutrophils 3.1 1.6 - 8.3 10e3/uL    Absolute Lymphocytes 1.5 0.8 - 5.3 10e3/uL    Absolute Monocytes 0.6 0.0 - 1.3 10e3/uL    Absolute Eosinophils 0.1 0.0 - 0.7 10e3/uL    Absolute Basophils 0.1 0.0 - 0.2 10e3/uL    Absolute Immature Granulocytes 0.0 <=0.4 10e3/uL    Absolute NRBCs 0.0 10e3/uL   Creatinine    Collection Time: 09/02/24 11:14 AM   Result Value Ref Range    Creatinine 0.90 0.67 - 1.17 mg/dL    GFR Estimate >90 >60 mL/min/1.73m2   Ketone Beta-Hydroxybutyrate Quantitative    Collection Time: 09/02/24 11:14 AM   Result Value Ref Range    Ketone (Beta-Hydroxybutyrate) Quantitative 3.96 (HH) <=0.30 mmol/L   UA with Microscopic reflex to Culture    Collection Time: 09/02/24  3:36 PM    Specimen: Urine, Clean Catch   Result Value Ref Range    Color Urine Yellow Colorless, Straw, Light Yellow, Yellow    Appearance Urine Clear Clear    Glucose Urine Negative Negative mg/dL    Bilirubin Urine Negative Negative    Ketones Urine 100 (A) Negative mg/dL    Specific Gravity Urine 1.026 1.001 - 1.030    Blood Urine Negative Negative    pH Urine  6.0 5.0 - 7.0    Protein Albumin Urine 20 (A) Negative mg/dL    Urobilinogen Urine <2.0 <2.0 mg/dL    Nitrite Urine Negative Negative    Leukocyte Esterase Urine Negative Negative    Mucus Urine Present (A) None Seen /LPF    RBC Urine 2 <=2 /HPF    WBC Urine 4 <=5 /HPF    Squamous Epithelials Urine <1 <=1 /HPF   Urine Drug Screen Panel    Collection Time: 09/02/24  3:36 PM   Result Value Ref Range    Amphetamines Urine Screen Negative Screen Negative    Barbituates Urine Screen Negative Screen Negative    Benzodiazepine Urine Screen Positive (A) Screen Negative    Cannabinoids Urine Screen Positive (A) Screen Negative    Cocaine Urine Screen Negative Screen Negative    Fentanyl Qual Urine Screen Negative Screen Negative    Opiates Urine Screen Negative Screen Negative    PCP Urine Screen Negative Screen Negative   Magnesium    Collection Time: 09/02/24  3:47 PM   Result Value Ref Range    Magnesium 2.0 1.7 - 2.3 mg/dL   Phosphorus    Collection Time: 09/02/24  3:47 PM   Result Value Ref Range    Phosphorus 4.0 2.5 - 4.5 mg/dL   Lactic acid whole blood    Collection Time: 09/02/24  3:47 PM   Result Value Ref Range    Lactic Acid 1.0 0.7 - 2.0 mmol/L   Extra Purple Top Tube    Collection Time: 09/02/24  3:47 PM   Result Value Ref Range    Hold Specimen [facility]    Comprehensive metabolic panel    Collection Time: 09/03/24  4:50 AM   Result Value Ref Range    Sodium 136 135 - 145 mmol/L    Potassium 3.5 3.4 - 5.3 mmol/L    Carbon Dioxide (CO2) 22 22 - 29 mmol/L    Anion Gap 10 7 - 15 mmol/L    Urea Nitrogen 14.0 6.0 - 20.0 mg/dL    Creatinine 0.78 0.67 - 1.17 mg/dL    GFR Estimate >90 >60 mL/min/1.73m2    Calcium 8.3 (L) 8.8 - 10.4 mg/dL    Chloride 104 98 - 107 mmol/L    Glucose 106 (H) 70 - 99 mg/dL    Alkaline Phosphatase 73 40 - 150 U/L    AST 41 0 - 45 U/L    ALT 43 0 - 70 U/L    Protein Total 6.1 (L) 6.4 - 8.3 g/dL    Albumin 3.9 3.5 - 5.2 g/dL    Bilirubin Total 0.7 <=1.2 mg/dL   Magnesium    Collection Time:  "09/03/24  4:50 AM   Result Value Ref Range    Magnesium 2.0 1.7 - 2.3 mg/dL   Phosphorus    Collection Time: 09/03/24  4:50 AM   Result Value Ref Range    Phosphorus 3.5 2.5 - 4.5 mg/dL   Ketone Beta-Hydroxybutyrate Quantitative    Collection Time: 09/03/24  4:50 AM   Result Value Ref Range    Ketone (Beta-Hydroxybutyrate) Quantitative 0.66 (H) <=0.30 mmol/L          Physical and Psychiatric Examination:     /60 (BP Location: Left arm)   Pulse 59   Temp 97.8  F (36.6  C) (Oral)   Resp 18   Ht 1.778 m (5' 10\")   Wt 77.8 kg (171 lb 9.6 oz)   SpO2 97%   BMI 24.62 kg/m    Weight is 171 lbs 9.6 oz  Body mass index is 24.62 kg/m .    Physical Exam:  I have reviewed the physical exam as documented by by the medical team and agree with findings and assessment and have no additional findings to add at this time.         MSE:   Calm and cooperative. Denies SI/SIB, as well as AH/VH. Insight is fair, judgement appears intact at time of meeting. Speech appropriate and within context.            DSM-5 Diagnosis:   300.02 (F41.1) Generalized Anxiety Disorder  Substance-Related & Addictive Disorders Alcohol Use Disorder   303.90 (F10.20) Moderate In a controlled environment          Assessment:   Psychiatry seeing patient today regarding passive suicidal thoughts, anxiety and substance-related disorder, alcohol. Patient does not have current or previous medication management or therapy for his BILL or substance-use. However, he appears motivated to follow up with outpatient therapy and psychiatry, as well as start medications to help manage anxiety and alcohol cravings.     Patient's increased drinking appears to be highly correlated with his mental health, so he may benefit from medication management to help decrease the severity of his anxiety and make it less likely that he consume alcohol.     Addendum: Today Oli does not show any symptoms of acute armen, nor suicidal or homicidal ideations. Therefore, based on " all available evidence including the factors cited above, he does not appear to be at imminent risk for self-harm, does not meet criteria for a 72-hr hold, and therefore remains appropriate for ongoing outpatient level of care.     Additional steps taken to minimize risk include: medication optimization, close psychiatric follow up and crisis resources. Voluntary referral for outpatient care was offered and patient accepted these offers.            Summary of Recommendations:   1) Started Zoloft 25 mg for long-term management of anxiety. This will take approximately 12-14 days to reach full efficacy. Side effects of GI upset and possible HA, which are self-limiting.     2) Started Naltrexone 25 mg daily to help target alcohol cravings.    Addendum: Patient has been scheduled for a psychiatry appointment on September 9th, regardless of insurance. The clinic will go over costs with him prior to ensure he wants to continue, if he chooses not to there are resources in his AVS for free med management and counseling.     Additional resources:    [facility] [phone]  Mental Health & Addiction Services   CURRENT AVAILABILITY    Mental Health All Ages  Assessment and Referrals, Individual and Group Therapy, Psychiatry, Intensive Outpatient Program, Partial Hospitalization Program, Dual Diagnosis Program, and 55+ Seniors Program    Substance Use Disorder: Adult & Adolescent  Assessment and Referrals, Outpatient JOSHUA Programs (Day & Evening Groups), Dual Diagnosis, Intensive Outpatient Programs with [facility], Residential Treatment, Addiction Medicine, Detox, and Medication Assisted Treatment (MAT) Services.    Several Locations PLUS Telephone or Video Visits Offered     [facility]   [phone]  *MyChart Appointments Available       CD Treatment Contact Info    Mental Health and Addiction Services Line: [phone] Appt through [employer].    [facility]  [facility]  "Admissions  Olinda Trotter - [phone]  [email or web address]    [facility] Virtual option  Phone: [phone]  Fax: [phone]    [facility]   [email or web address]  Phone: [phone]  Fax: [phone]    [facility] in [place] ([facility] [place])  Phone: [phone]  Fax: [phone]    Supportive Services    [facility] Recovery   https://www.smartrecovery.org    [place] [facility]  [address]  [place] [address]  Phone: [phone] http://Cybits    Alcoholics Anonymous   http://www.aa.org    Community Drug & Alcohol Support Resources  Alcoholics Anonymous  24/7 Phone Line: 560.667.3621  https://aam[place].org/ For additional list of online meetings: http://aa-intergroup.org          Additional Crisis Resources:   Crisis Intervention: 915.630.3942 or 264-623-4073 (TTY: 928.793.5927).  Call anytime for help.  National [place] on Mental Illness (www.[place].jose guadalupe.org): 894.863.4640 or 453-164-4069.  Suicide Awareness Voices of Education (SAVE) (www.save.org): 982-271-RPQQ (8303)  National Suicide Prevention Line (www.mentalhealthmn.org): 722-074-VGVN (6329)  Mental Health Consumer/Survivor Network of [place] (www.mhcsn.net): 235.270.4334 or 936-032-7052  [facility] Crisis Response 666 496-8351  Text 4 Life: txt \"LIFE\" to 04938 for immediate support and crisis intervention  Crisis text line: Text \"[place]\" to 152307. Free, confidential, 24/7.        JOSE GUADALUPE Rodriguez ([employer] [place] on Mental Illness) improves the lives of children and adults with mental illnesses and their families by providing free classes on mental illnesses and support groups for adults with mental illnesses, parents and family members. For more information: Phone: 136.142.9251 Toll free: 9-608-TXKE-HELPS Website: www.namihelps.orghttp://www.namihelps.org/        Page me or re-consult psychiatry as needed.       Chrisitne Koch, ALECIA, APRN  Consult/Liaison Psychiatry  [facility] " [facility]   Contact information available via [facility] Paging/Directory.  If I am not available, please call [facility] intake ([phone])

## 2024-09-03 NOTE — PLAN OF CARE
Goal Outcome Evaluation:  DISCHARGE                         09/03/24 4:58 PM   ----------------------------------------------------------------------------  Discharged to: Home  Via: Automobile  Accompanied by: Family (wife)  Discharge Instructions: diet, activity, medications, follow up appointments, when to call the MD, aftercare instructions, and what to watchout for (i.e. s/s of infection, increasing SOB, palpitations, chest pain,)  Prescriptions: To be filled by    [facility]acy per pt's request; medication list reviewed & sent with pt  Follow Up Appointments: arranged; information given  Belongings: All sent with pt  IV: out  Telemetry: off  Pt exhibits understanding of above discharge instructions; all questions answered.    Discharge Paperwork: Sent home with patient.       Plan of Care Reviewed With: patient, spouse

## 2024-09-03 NOTE — PLAN OF CARE
Problem: Adult Inpatient Plan of Care  Goal: Plan of Care Review  Description: The Plan of Care Review/Shift note should be completed every shift.  The Outcome Evaluation is a brief statement about your assessment that the patient is improving, declining, or no change.  This information will be displayed automatically on your shift  note.  Outcome: Progressing    Pt denies pain. CIWA scores 5 and 3 this shift. Tele NSR x 2. Pt remains on 1:1 for safety. Mg 2.0 and Ph 4.0, both recheck 9/4 AM.

## 2024-09-03 NOTE — PLAN OF CARE
Goal Outcome Evaluation:  Pt A+O, able to make needs known. Low scores on CIWA for tremors and anxiety. Pt met with , chem dep, psych. Psych paged several times re: medication prescription for discharge. Patient wants to go home and be with family/kids. Awaiting psych rx for discharge.    Karon Kraus RN                      Problem: Adult Inpatient Plan of Care  Goal: Plan of Care Review  Description: The Plan of Care Review/Shift note should be completed every shift.  The Outcome Evaluation is a brief statement about your assessment that the patient is improving, declining, or no change.  This information will be displayed automatically on your shift  note.  Outcome: Progressing     Problem: Alcohol Withdrawal  Goal: Alcohol Withdrawal Symptom Control  Outcome: Progressing

## 2024-09-03 NOTE — CONSULTS
Care Management Initial Consult    General Information  Assessment completed with: Patient,    Type of CM/SW Visit: Initial Assessment    Primary Care Provider verified and updated as needed: Yes   Readmission within the last 30 days: no previous admission in last 30 days      Reason for Consult: financial concerns, insurance concerns, substance use concerns  Advance Care Planning:            Communication Assessment  Patient's communication style: spoken language (English or Bilingual)    Hearing Difficulty or Deaf: no   Wear Glasses or Blind: no    Cognitive  Cognitive/Neuro/Behavioral: .WDL except, mood/behavior  Level of Consciousness: alert     Orientation: oriented x 4  Mood/Behavior: anxious (pt states anxiety, improving)          Living Environment:   People in home: child(christian), dependent, parent(s), significant other     Current living Arrangements: house      Able to return to prior arrangements: yes       Family/Social Support:  Care provided by: self  Provides care for: child(christian)  Marital Status: Lives with Significant Other  Support system: Significant Other, Parent(s)       Carole  Description of Support System: Supportive, Involved         Current Resources:   Patient receiving home care services: No        Community Resources: None  Equipment currently used at home: none  Supplies currently used at home: None    Employment/Financial:  Employment Status: employed full-time        Financial Concerns: insurance, none   Referral to Financial Worker: Yes       Does the patient's insurance plan have a 3 day qualifying hospital stay waiver?  No    Lifestyle & Psychosocial Needs:  Social Determinants of Health     Food Insecurity: Low Risk  (9/2/2024)    Food Insecurity     Within the past 12 months, did you worry that your food would run out before you got money to buy more?: No     Within the past 12 months, did the food you bought just not last and you didn t have money to get more?: No   Depression:  Not on file   Housing Stability: Low Risk  (9/2/2024)    Housing Stability     Do you have housing? : Yes     Are you worried about losing your housing?: No   Tobacco Use: High Risk (7/11/2021)    Patient History     Smoking Tobacco Use: Every Day     Smokeless Tobacco Use: Unknown     Passive Exposure: Not on file   Financial Resource Strain: Low Risk  (9/2/2024)    Financial Resource Strain     Within the past 12 months, have you or your family members you live with been unable to get utilities (heat, electricity) when it was really needed?: No   Alcohol Use: Not on file   Transportation Needs: Low Risk  (9/2/2024)    Transportation Needs     Within the past 12 months, has lack of transportation kept you from medical appointments, getting your medicines, non-medical meetings or appointments, work, or from getting things that you need?: No   Physical Activity: Not on file   Interpersonal Safety: Low Risk  (9/2/2024)    Interpersonal Safety     Do you feel physically and emotionally safe where you currently live?: Yes     Within the past 12 months, have you been hit, slapped, kicked or otherwise physically hurt by someone?: No     Within the past 12 months, have you been humiliated or emotionally abused in other ways by your partner or ex-partner?: No   Stress: Not on file   Social Connections: Not on file   Health Literacy: Not on file       Functional Status:  Prior to admission patient needed assistance:   Dependent ADLs:: Independent  Dependent IADLs:: Independent       Mental Health Status:  Mental Health Status: No Current Concerns       Chemical Dependency Status:  Chemical Dependency Status: Current Concern             Values/Beliefs:  Spiritual, Cultural Beliefs, Anabaptism Practices, Values that affect care: no               Discussed  Partnership in Safe Discharge Planning  document with patient/family: Yes:         Additional Information:  GREGGCM met and introduced self and CM services to Pt and Sig Other  who was visiting. Pt lives with Sig Other, His father, and 2 dependent dtrs. Pt independent at baseline.  Pt works full time.  [facility] asked Pt about CD Resources.  Pt has not been to treatment since he was a child.  Pt is interested in Out Pt CD.  Hospitalist will put CD Consult order in. Pt has no insurance and states that he met with FC already and makes to much money for MA.  Family to transport.     4:03 PM  SUDS talked with Pt today and Pt given list of Treatment options.  Psych saw Pt see note for more information.     Next Steps: F/U on SUDS recommendations.     DILLON Patel

## 2024-09-03 NOTE — PLAN OF CARE
Goal Outcome Evaluation:      Plan of Care Reviewed With: patient    Overall Patient Progress: improvingOverall Patient Progress: improving    Outcome Evaluation: Pt to  return home with Out Pt CD Treatment
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
## 2024-09-03 NOTE — DISCHARGE SUMMARY
[facility]  Hospitalist Discharge Summary      Date of Admission:  9/2/2024  Date of Discharge:  9/3/2024  Discharging Provider: BRAYDEN TERRY MD  Discharge Service: Hospitalist Service    Discharge Diagnoses   Alcohol use disorder  Alcohol withdrawal syndrome  Alcoholic ketoacidosis  Anion gap metabolic acidosis  Passive suicidal thoughts  History of anxiety      Clinically Significant Risk Factors          Follow-ups Needed After Discharge   Follow-up Appointments     Follow-up and recommended labs and tests       Follow up with primary care provider, Physician No Ref-Primary, within 7   days for hospital follow- up.  No follow up labs or test are needed.  Follow-up with psychiatry as an outpatient  Follow-up with alcohol chemical dependency team as an outpatient            Unresulted Labs Ordered in the Past 30 Days of this Admission       No orders found for last 31 day(s).            Discharge Disposition   Discharged to home  Condition at discharge: Good    Hospital Course   Oli Akins is a 33 year old male admitted on 9/2/2024. He has a past medical history significant for anxiety, alcohol use disorder who presented to the hospital due to alcohol withdrawal and passive suicidal thoughts. Initially had high anion gap metabolic acidosis with elevated ketone most likely alcoholic ketoacidosis.  Received IV fluid with resolution of metabolic acidosis.  nitiated on CIWA protocol on presentation symptoms are improving. Low CIWA scores on 9/3.  Evaluated by chemical dependency and psychiatry.  Per psychiatry, the patient is not at risk of harming himself or others.  Cleared to discharge home.  Vitals are stable.  Patient insisted to go home on 9/3.      Alcohol use disorder  Alcohol withdrawal syndrome  Mild metabolic acidosis  Drinks around 1 pint of maliha a day.  Last alcoholic drink on 8/31  Denies any alcohol drawl seizures  9/3, minimal signs of alcohol withdrawal.  Patient  "educated that it has been around 4 days since his last alcoholic drinks and and therefore he is not completely out of the window of alcohol withdrawal.  He understand the risk, insisted to go home on 9/3.        Plan  Continue folic acid and thiamine on discharge.  Discharged on naltrexone 25 mg daily for alcohol craving  Follow-up with chemical dependency as an outpatient.  Patient refused inpatient chemical dependency admission.  Educated about the importance of complete alcohol cessation.    Anxiety  Passive suicidal thoughts  Reported history of anxiety, however, he is not on any medications.  Multiple life stressors including his father's health, will work, taking care of his 2 young children.  Patient reported that he only had passive suicidal thoughts \"I think sometimes my thyroid would be better without me\"   Initially, the patient's wife has concerns about his passive suicidal thoughts.  Nevertheless, 9/3, patient confirmed that he does not have any passive or active suicidal thoughts.  The patient's wife feels that he is safe at home and requested that the patient get discharged on 9/3.  Evaluated by psychiatry who does not think the patient is at risk of harming himself or others.  Cleared for discharge from psychiatry standpoint.    Plan  Educated the patient and his wife about the importance of removing all means of self-harm particularly firearms.  Discharged on Zoloft 25 mg daily as per psychiatry recommendation  Discharged on hydroxyzine as needed  Follow-up with psychiatry as an outpatient.    Tobacco use disorder  Smokes around 1 pack/day since the age of 14.    Plan  Educated about the importance of tobacco cessation          Consultations This Hospital Stay   DIAGNOSTIC EVALUATION CENTER (DEC) ASSESSMENT ORDER  PSYCHIATRY IP CONSULT  CARE MANAGEMENT / SOCIAL WORK IP CONSULT  CARE MANAGEMENT / SOCIAL WORK IP CONSULT  CHEMICAL DEPENDENCY IP CONSULT  CARE MANAGEMENT / SOCIAL WORK IP CONSULT    Code " Status   Full Code    Time Spent on this Encounter   I, BRAYDEN TERRY MD, personally saw the patient today and spent greater than 30 minutes discharging this patient.       BRAYDEN TERRY MD  [facility]  [address] [address]  Phone: [phone]  Fax: [phone]  ______________________________________________________________________    Physical Exam   Vital Signs: Temp: 98  F (36.7  C) Temp src: Oral BP: 117/69 Pulse: 65   Resp: 16 SpO2: 96 % O2 Device: None (Room air)    Weight: 171 lbs 9.6 oz  Physical Exam  Constitutional:       General: He is not in acute distress.     Appearance: He is not ill-appearing or toxic-appearing.   Cardiovascular:      Rate and Rhythm: Normal rate.   Pulmonary:      Effort: Pulmonary effort is normal. No respiratory distress.   Neurological:      Mental Status: He is alert.   Psychiatric:         Mood and Affect: Mood normal. Affect is not angry.         Speech: Speech normal.         Behavior: Behavior is not agitated. Behavior is cooperative.         Thought Content: Thought content does not include homicidal or suicidal ideation. Thought content does not include homicidal or suicidal plan.             Primary Care Physician   Physician No Ref-Primary    Discharge Orders      Adult Mental Health  Referral      Reason for your hospital stay    Alcohol use disorder, alcohol withdrawal syndrome, anxiety     Follow-up and recommended labs and tests     Follow up with primary care provider, Physician No Ref-Primary, within 7 days for hospital follow- up.  No follow up labs or test are needed.  Follow-up with psychiatry as an outpatient  Follow-up with alcohol chemical dependency team as an outpatient     Activity    Your activity upon discharge: activity as tolerated     Diet    Follow this diet upon discharge: Current Diet:Orders Placed This Encounter      Regular Diet Adult       Significant Results and Procedures    Most Recent 3 CBC's:  Recent Labs   Lab Test 09/02/24  1114 12/20/22  1116   WBC 5.5 7.0   HGB 16.5 15.2   MCV 88 91    291     Most Recent 3 BMP's:  Recent Labs   Lab Test 09/03/24  0450 09/02/24  1114 05/08/20  1736    138 141   POTASSIUM 3.5 4.5 4.1   CHLORIDE 104 96* 105   CO2 22 17* 23   BUN 14.0 17.2 13   CR 0.78 0.90  0.90 0.88   ANIONGAP 10 25* 13   DENTON 8.3* 9.8 9.1   * 99 95   ,   Results for orders placed or performed during the hospital encounter of 07/11/24   CT Lumbar Spine w/o Contrast    Narrative    EXAM: CT LUMBAR SPINE W/O CONTRAST  LOCATION: [facility]  DATE: 7/11/2024    INDICATION: spinal,  paraspinal tenderness; Low back pain; Low back pain, no complicating feature; No chronic LBP duration >= 3 months  COMPARISON: None.  TECHNIQUE: Routine CT Lumbar Spine without IV contrast. Multiplanar reformats. Dose reduction techniques were used.     FINDINGS:  Transitional vertebral anatomy with a partially lumbarized S1 vertebral body.  Careful attention to the numbering convention is recommended prior to any future percutaneous or surgical intervention.    L5 spondylolysis with spondylolisthesis of L5 on S1 measuring 1 mm. The vertebral bodies of the lumbar spine otherwise have normal stature and alignment. No evidence of acute fracture or subluxation of the lumbar spine by CT imaging. The disc spaces are   well-maintained. No significant degenerative changes.    T12/L1:  No posterior disc bulge or spinal canal narrowing. No neural foraminal narrowing.    L1/L2:  No posterior disc bulge or spinal canal narrowing. No neural foraminal narrowing.    L2/L3:  No posterior disc bulge or spinal canal narrowing. No neural foraminal narrowing.    L3/L4:  No posterior disc bulge or spinal canal narrowing. No neural foraminal narrowing.      L4/L5:  No posterior disc bulge or spinal canal narrowing. No neural foraminal narrowing.     L5/S1: No posterior disc bulge  or spinal canal narrowing. Mild bilateral facet arthropathy. No neural foraminal narrowing.    S1/S2: No posterior disc bulge or spinal canal narrowing. No neural foraminal narrowing.       Impression    IMPRESSION:    1.  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  Careful attention to the numbering convention is recommended prior to any future percutaneous or surgical intervention.  2.  No evidence of acute fracture or subluxation of the lumbar spine by CT imaging.  3.  L5 spondylolysis with spondylolisthesis of L5 on S1 measuring 1 mm.   4.  Degenerative lumbar spondylosis with level by level analysis as described above.       Discharge Medications   Current Discharge Medication List        START taking these medications    Details   folic acid (FOLVITE) 1 MG tablet Take 1 tablet (1 mg) by mouth daily.  Qty: 30 tablet, Refills: 0    Associated Diagnoses: Alcohol abuse      hydrOXYzine HCl (ATARAX) 25 MG tablet Take 1 tablet (25 mg) by mouth every 8 hours as needed for anxiety.  Qty: 10 tablet, Refills: 0    Associated Diagnoses: Anxiety      naltrexone (DEPADE/REVIA) 50 MG tablet Take 0.5 tablets (25 mg) by mouth daily.  Qty: 7 tablet, Refills: 0    Associated Diagnoses: Alcohol withdrawal syndrome with complication (H)      sertraline (ZOLOFT) 25 MG tablet Take 1 tablet (25 mg) by mouth daily.  Qty: 30 tablet, Refills: 1    Associated Diagnoses: Anxiety      thiamine (B-1) 100 MG tablet Take 1 tablet (100 mg) by mouth daily.  Qty: 30 tablet, Refills: 0    Associated Diagnoses: Alcohol abuse           CONTINUE these medications which have NOT CHANGED    Details   UNABLE TO FIND Take 1 tablet by mouth daily. MEDICATION NAME: naturopathic OTC that has alicia in it           STOP taking these medications       cyclobenzaprine (FLEXERIL) 10 MG tablet Comments:   Reason for Stopping:         ibuprofen (ADVIL/MOTRIN) 200 MG tablet Comments:   Reason for Stopping:         ketorolac  (TORADOL) 10 MG tablet Comments:   Reason for Stopping:             Allergies   No Known Allergies

## 2024-09-03 NOTE — CONSULTS
9/3/2024  Pt completed his JOSHUA CA today. I emailed his fianceCarole, a list of potential [facility] JOSHUA treatment programs. I explained to both pt and his fiance that they should call each program to see what the self-pay cost is. I requested they call or email me which tx program they would like to attend.    Update:  Pt stated he would like to go to [facility] in [place]. The JORDIN for [facility] was emailed to the unit  today. His JOSHUA CA was emailed to [facility] today.    [facility]  Service Initiation Date ID: [id number]    Recommendations:   1)  Complete an Intensive Outpatient CD Treatment Program.  2)  Abstain from all mood-altering chemicals unless prescribed by a licensed provider.   3)  Attend, at minimum, 2 weekly support group meetings, such as 12 step based (AA/NA), SMART Recovery, Health Realizations, and/or Refuge Recovery meetings.     4)  Actively work with a male mentor/sponsor on a weekly basis.   5)  Follow all the recommendations of your treatment/medical providers.    Clinical Substantiation:    Pt has a long history of alcohol and marijuana use. Pt has minimal sober coping skills. He has a supportive fiance and sober father. He is willing to attend an IOP JOSHUA treatment program.    Referrals/ Alternatives:  [facility]  Main phone: [phone]  Direct Dial: [phone]   Admissions email: [email or web address]   JOSHUA fax: [phone]  www.[employer] [address] [address]  Phone: [phone]  fax: [phone]  email: [email or web address]  www.[employer] [address]  Phone: [phone]  Fax: [phone]  Email: [email or web address]   [email or web address]/[email or web address]  AM Group: 9 a.m.-12 p.m., Tuesday, Wednesday, Thursday  PM Group: 6-9 p.m., Monday, Wednesday, Thursday    [facility] IOP:  Coordinator: Olinda  Roxann  Phone: [phone]  email: [email or web address]  [email or web address]-Use   [place] Co-occurring IOP: M,W, TH 9am-12pm    [facility] Evening IOP: M, W, TH 6:00pm-9:00pm    [place] Outpatient:  Locations: [place], [place], [place], [place], [place], [place], [place] and two in [facility]  Phone: [phone]  Fax:  [phone]  Email: [email or web address]  [email or web address]    [facility] [address]  [place] [address]  Phone: [phone]  Fax: [phone]  Email: [email or web address]  [email or web address]  Program is 12 hours per week  Day program meets Monday - Thursday from 9am - 12pm    JOSHUA consult completed by: Tanvi Melgar [facility].  Phone Number: [phone]  E-mail Address: [email or web address] Evaluation Department  [address] [place] [address]     *Due to regulation of Title 42 of the Code of Federal Regulations (CFR) Part 2: Confidentiality laws apply to this note and the information wherein.  Thus, this note cannot be copy and pasted into any other health care staff's note nor can it be included in general medical records sent to ANY outside agency without the patient's written consent.

## 2024-09-03 NOTE — PROGRESS NOTES
[facility]  [address] [place] [id number]      9/3/2024      Oli Akins  [address] [place] [address]      Dear Mr. Akins,    Here are some programs to look into.  Since you completed a JOSHUA Comprehensive Assessment with me, you can let me know which program you would like to attend, and I will fax your assessment to the program of choice for you to get started. I will need you to sign a Release of Information before I fax your assessment. This Release of Information can be sent to you via [employer] (electronic) or via the Unit . If you have questions, my phone number is [phone].    Thanks,  Stephanie      Referrals/ Alternatives:  [facility]  Main phone: [phone]  Direct Dial: [phone]   Admissions email: [email or web address]   JOSHUA fax: [phone]  www.[employer] [address] [address]  Phone: [phone]  fax: [phone]  email: [email or web address]  www.[employer]    [address] [address]  Phone: [phone]  Fax: [phone]  Email: [email or web address]   [email or web address]://www.[email or web address] [facility].org/substance-use-services/  AM Group: 9 a.m.-12 p.m., Tuesday, Wednesday, Thursday  PM Group: 6-9 p.m., Monday, Wednesday, Thursday    [facility] IOP:  Coordinator: Olinda Trotter  Phone: [phone]  email: [email or web address]  [email or web address]/specialties/Substance-Use   [place] Co-occurring IOP: M,W, TH 9am-12pm    [facility] Evening IOP: M, W, TH 6:00pm-9:00pm    [place] Outpatient:  Locations: [facility], [place], [place], [place], [place], [place] and two in [facility]  Phone: [phone]  Fax:  [phone]  Email: [email or web address]  [email or web address]    [facility] [place]  [address] [facility]  [address]  [place] [address]  Phone: [phone]  Fax: [phone]  Email: [email or web address]  [email or web address]/[facility]in[email or web address]  Program is 12 hours per week  Day program meets Monday - Thursday from 9am - 12pm      Tanvi Huff MA [facility]  CD Evaluation Counselor  [phone]    (This was emailed to pt today)

## 2024-09-05 ENCOUNTER — PATIENT OUTREACH (OUTPATIENT)
Dept: CARE COORDINATION | Facility: CLINIC | Age: 34
End: 2024-09-05

## 2024-09-05 NOTE — PROGRESS NOTES
Connected Care Resource Center Contact  [facility]     Clinical Data: Post-Discharge Outreach     Unable to leave [facility] as this is not set up.     Plan:  [facility] will do no further outreaches at this time.       DILLON Li  [facility] Resource [place]    *Connected Care Resource Team does NOT follow patient ongoing. Referrals are identified based on internal discharge reports and the outreach is to ensure patient has an understanding of their discharge instructions.